# Patient Record
Sex: MALE | Race: WHITE | NOT HISPANIC OR LATINO | Employment: OTHER | URBAN - METROPOLITAN AREA
[De-identification: names, ages, dates, MRNs, and addresses within clinical notes are randomized per-mention and may not be internally consistent; named-entity substitution may affect disease eponyms.]

---

## 2021-01-01 ENCOUNTER — APPOINTMENT (OUTPATIENT)
Dept: PHYSICAL THERAPY | Facility: HOSPITAL | Age: 76
DRG: 193 | End: 2021-01-01
Payer: MEDICARE

## 2021-01-01 ENCOUNTER — APPOINTMENT (OUTPATIENT)
Dept: CT IMAGING | Facility: HOSPITAL | Age: 76
DRG: 193 | End: 2021-01-01
Attending: EMERGENCY MEDICINE
Payer: MEDICARE

## 2021-01-01 ENCOUNTER — APPOINTMENT (OUTPATIENT)
Dept: OCCUPATIONAL THERAPY | Facility: HOSPITAL | Age: 76
DRG: 193 | End: 2021-01-01
Attending: FAMILY MEDICINE
Payer: MEDICARE

## 2021-01-01 ENCOUNTER — HOSPITAL ENCOUNTER (INPATIENT)
Facility: HOSPITAL | Age: 76
LOS: 2 days | Discharge: HOME HEALTH | DRG: 193 | End: 2021-11-09
Attending: EMERGENCY MEDICINE | Admitting: FAMILY MEDICINE
Payer: MEDICARE

## 2021-01-01 ENCOUNTER — APPOINTMENT (OUTPATIENT)
Dept: OCCUPATIONAL THERAPY | Facility: HOSPITAL | Age: 76
DRG: 193 | End: 2021-01-01
Payer: MEDICARE

## 2021-01-01 ENCOUNTER — PATIENT OUTREACH (OUTPATIENT)
Dept: CARE COORDINATION | Facility: CLINIC | Age: 76
End: 2021-01-01
Payer: COMMERCIAL

## 2021-01-01 ENCOUNTER — APPOINTMENT (OUTPATIENT)
Dept: PHYSICAL THERAPY | Facility: HOSPITAL | Age: 76
DRG: 193 | End: 2021-01-01
Attending: FAMILY MEDICINE
Payer: MEDICARE

## 2021-01-01 ENCOUNTER — APPOINTMENT (OUTPATIENT)
Dept: CARDIOLOGY | Facility: HOSPITAL | Age: 76
DRG: 193 | End: 2021-01-01
Attending: HOSPITALIST
Payer: MEDICARE

## 2021-01-01 VITALS
HEIGHT: 72 IN | HEART RATE: 62 BPM | TEMPERATURE: 97.6 F | WEIGHT: 172.6 LBS | OXYGEN SATURATION: 92 % | DIASTOLIC BLOOD PRESSURE: 63 MMHG | SYSTOLIC BLOOD PRESSURE: 119 MMHG | RESPIRATION RATE: 16 BRPM | BODY MASS INDEX: 23.38 KG/M2

## 2021-01-01 DIAGNOSIS — R55 SYNCOPE, UNSPECIFIED SYNCOPE TYPE: ICD-10-CM

## 2021-01-01 DIAGNOSIS — J15.9 COMMUNITY ACQUIRED BACTERIAL PNEUMONIA: ICD-10-CM

## 2021-01-01 DIAGNOSIS — G93.40 ENCEPHALOPATHY ACUTE: Primary | ICD-10-CM

## 2021-01-01 DIAGNOSIS — R07.9 CHEST PAIN, UNSPECIFIED TYPE: ICD-10-CM

## 2021-01-01 DIAGNOSIS — Z71.89 OTHER SPECIFIED COUNSELING: ICD-10-CM

## 2021-01-01 LAB
ANION GAP SERPL CALCULATED.3IONS-SCNC: 8 MMOL/L (ref 5–18)
ANION GAP SERPL CALCULATED.3IONS-SCNC: 8 MMOL/L (ref 5–18)
ATRIAL RATE - MUSE: 62 BPM
BASOPHILS # BLD AUTO: 0.1 10E3/UL (ref 0–0.2)
BASOPHILS NFR BLD AUTO: 1 %
BUN SERPL-MCNC: 12 MG/DL (ref 8–28)
BUN SERPL-MCNC: 16 MG/DL (ref 8–28)
CALCIUM SERPL-MCNC: 10 MG/DL (ref 8.5–10.5)
CALCIUM SERPL-MCNC: 9.7 MG/DL (ref 8.5–10.5)
CHLORIDE BLD-SCNC: 103 MMOL/L (ref 98–107)
CHLORIDE BLD-SCNC: 104 MMOL/L (ref 98–107)
CO2 SERPL-SCNC: 29 MMOL/L (ref 22–31)
CO2 SERPL-SCNC: 30 MMOL/L (ref 22–31)
CREAT SERPL-MCNC: 0.91 MG/DL (ref 0.7–1.3)
CREAT SERPL-MCNC: 1.23 MG/DL (ref 0.7–1.3)
D DIMER PPP FEU-MCNC: 1.26 UG/ML FEU (ref 0–0.5)
DIASTOLIC BLOOD PRESSURE - MUSE: NORMAL MMHG
EOSINOPHIL # BLD AUTO: 0.4 10E3/UL (ref 0–0.7)
EOSINOPHIL NFR BLD AUTO: 3 %
ERYTHROCYTE [DISTWIDTH] IN BLOOD BY AUTOMATED COUNT: 13.8 % (ref 10–15)
ERYTHROCYTE [DISTWIDTH] IN BLOOD BY AUTOMATED COUNT: 14.2 % (ref 10–15)
GFR SERPL CREATININE-BSD FRML MDRD: 57 ML/MIN/1.73M2
GFR SERPL CREATININE-BSD FRML MDRD: 82 ML/MIN/1.73M2
GLUCOSE BLD-MCNC: 107 MG/DL (ref 70–125)
GLUCOSE BLD-MCNC: 85 MG/DL (ref 70–125)
GLUCOSE BLDC GLUCOMTR-MCNC: 101 MG/DL (ref 70–99)
HCT VFR BLD AUTO: 41.6 % (ref 40–53)
HCT VFR BLD AUTO: 43.3 % (ref 40–53)
HGB BLD-MCNC: 13.5 G/DL (ref 13.3–17.7)
HGB BLD-MCNC: 14 G/DL (ref 13.3–17.7)
IMM GRANULOCYTES # BLD: 0 10E3/UL
IMM GRANULOCYTES NFR BLD: 0 %
INTERPRETATION ECG - MUSE: NORMAL
LVEF ECHO: NORMAL
LYMPHOCYTES # BLD AUTO: 2.5 10E3/UL (ref 0.8–5.3)
LYMPHOCYTES NFR BLD AUTO: 21 %
MCH RBC QN AUTO: 30.6 PG (ref 26.5–33)
MCH RBC QN AUTO: 30.9 PG (ref 26.5–33)
MCHC RBC AUTO-ENTMCNC: 32.3 G/DL (ref 31.5–36.5)
MCHC RBC AUTO-ENTMCNC: 32.5 G/DL (ref 31.5–36.5)
MCV RBC AUTO: 95 FL (ref 78–100)
MCV RBC AUTO: 95 FL (ref 78–100)
MONOCYTES # BLD AUTO: 0.9 10E3/UL (ref 0–1.3)
MONOCYTES NFR BLD AUTO: 8 %
NEUTROPHILS # BLD AUTO: 8.1 10E3/UL (ref 1.6–8.3)
NEUTROPHILS NFR BLD AUTO: 67 %
NRBC # BLD AUTO: 0 10E3/UL
NRBC BLD AUTO-RTO: 0 /100
P AXIS - MUSE: 62 DEGREES
PLATELET # BLD AUTO: 234 10E3/UL (ref 150–450)
PLATELET # BLD AUTO: 237 10E3/UL (ref 150–450)
POTASSIUM BLD-SCNC: 4.3 MMOL/L (ref 3.5–5)
POTASSIUM BLD-SCNC: 4.8 MMOL/L (ref 3.5–5)
PR INTERVAL - MUSE: 110 MS
PROCALCITONIN SERPL-MCNC: 0.04 NG/ML (ref 0–0.49)
QRS DURATION - MUSE: 130 MS
QT - MUSE: 412 MS
QTC - MUSE: 418 MS
R AXIS - MUSE: 97 DEGREES
RBC # BLD AUTO: 4.37 10E6/UL (ref 4.4–5.9)
RBC # BLD AUTO: 4.58 10E6/UL (ref 4.4–5.9)
SARS-COV-2 RNA RESP QL NAA+PROBE: NEGATIVE
SODIUM SERPL-SCNC: 140 MMOL/L (ref 136–145)
SODIUM SERPL-SCNC: 142 MMOL/L (ref 136–145)
SYSTOLIC BLOOD PRESSURE - MUSE: NORMAL MMHG
T AXIS - MUSE: 54 DEGREES
TROPONIN I SERPL-MCNC: 0.12 NG/ML (ref 0–0.29)
TROPONIN I SERPL-MCNC: 0.13 NG/ML (ref 0–0.29)
TROPONIN I SERPL-MCNC: 0.18 NG/ML (ref 0–0.29)
VENTRICULAR RATE- MUSE: 62 BPM
WBC # BLD AUTO: 11.9 10E3/UL (ref 4–11)
WBC # BLD AUTO: 8.7 10E3/UL (ref 4–11)

## 2021-01-01 PROCEDURE — G0378 HOSPITAL OBSERVATION PER HR: HCPCS

## 2021-01-01 PROCEDURE — 36415 COLL VENOUS BLD VENIPUNCTURE: CPT | Performed by: HOSPITALIST

## 2021-01-01 PROCEDURE — 250N000013 HC RX MED GY IP 250 OP 250 PS 637: Performed by: FAMILY MEDICINE

## 2021-01-01 PROCEDURE — 255N000002 HC RX 255 OP 636: Performed by: FAMILY MEDICINE

## 2021-01-01 PROCEDURE — 210N000001 HC R&B IMCU HEART CARE

## 2021-01-01 PROCEDURE — 96372 THER/PROPH/DIAG INJ SC/IM: CPT

## 2021-01-01 PROCEDURE — 97116 GAIT TRAINING THERAPY: CPT | Mod: GP

## 2021-01-01 PROCEDURE — 97535 SELF CARE MNGMENT TRAINING: CPT | Mod: GO

## 2021-01-01 PROCEDURE — 97166 OT EVAL MOD COMPLEX 45 MIN: CPT | Mod: GO

## 2021-01-01 PROCEDURE — 80048 BASIC METABOLIC PNL TOTAL CA: CPT | Performed by: FAMILY MEDICINE

## 2021-01-01 PROCEDURE — 84484 ASSAY OF TROPONIN QUANT: CPT | Performed by: EMERGENCY MEDICINE

## 2021-01-01 PROCEDURE — 99232 SBSQ HOSP IP/OBS MODERATE 35: CPT | Performed by: INTERNAL MEDICINE

## 2021-01-01 PROCEDURE — 87635 SARS-COV-2 COVID-19 AMP PRB: CPT | Performed by: EMERGENCY MEDICINE

## 2021-01-01 PROCEDURE — 71275 CT ANGIOGRAPHY CHEST: CPT

## 2021-01-01 PROCEDURE — 36415 COLL VENOUS BLD VENIPUNCTURE: CPT | Performed by: FAMILY MEDICINE

## 2021-01-01 PROCEDURE — 85025 COMPLETE CBC W/AUTO DIFF WBC: CPT | Performed by: EMERGENCY MEDICINE

## 2021-01-01 PROCEDURE — 97110 THERAPEUTIC EXERCISES: CPT | Mod: GP

## 2021-01-01 PROCEDURE — 250N000013 HC RX MED GY IP 250 OP 250 PS 637: Performed by: INTERNAL MEDICINE

## 2021-01-01 PROCEDURE — 84484 ASSAY OF TROPONIN QUANT: CPT | Performed by: HOSPITALIST

## 2021-01-01 PROCEDURE — 250N000011 HC RX IP 250 OP 636

## 2021-01-01 PROCEDURE — 99220 PR INITIAL OBSERVATION CARE,LEVEL III: CPT | Performed by: FAMILY MEDICINE

## 2021-01-01 PROCEDURE — 96365 THER/PROPH/DIAG IV INF INIT: CPT | Mod: 59

## 2021-01-01 PROCEDURE — 93306 TTE W/DOPPLER COMPLETE: CPT | Mod: 26 | Performed by: INTERNAL MEDICINE

## 2021-01-01 PROCEDURE — 93005 ELECTROCARDIOGRAM TRACING: CPT | Performed by: EMERGENCY MEDICINE

## 2021-01-01 PROCEDURE — 99223 1ST HOSP IP/OBS HIGH 75: CPT | Performed by: NURSE PRACTITIONER

## 2021-01-01 PROCEDURE — 36415 COLL VENOUS BLD VENIPUNCTURE: CPT | Performed by: EMERGENCY MEDICINE

## 2021-01-01 PROCEDURE — 999N000208 ECHOCARDIOGRAM COMPLETE

## 2021-01-01 PROCEDURE — 85379 FIBRIN DEGRADATION QUANT: CPT | Performed by: EMERGENCY MEDICINE

## 2021-01-01 PROCEDURE — 96361 HYDRATE IV INFUSION ADD-ON: CPT

## 2021-01-01 PROCEDURE — 250N000011 HC RX IP 250 OP 636: Performed by: EMERGENCY MEDICINE

## 2021-01-01 PROCEDURE — 99239 HOSP IP/OBS DSCHRG MGMT >30: CPT | Performed by: INTERNAL MEDICINE

## 2021-01-01 PROCEDURE — 84145 PROCALCITONIN (PCT): CPT | Performed by: HOSPITALIST

## 2021-01-01 PROCEDURE — 258N000003 HC RX IP 258 OP 636: Performed by: HOSPITALIST

## 2021-01-01 PROCEDURE — 250N000013 HC RX MED GY IP 250 OP 250 PS 637: Performed by: EMERGENCY MEDICINE

## 2021-01-01 PROCEDURE — 85027 COMPLETE CBC AUTOMATED: CPT | Performed by: FAMILY MEDICINE

## 2021-01-01 PROCEDURE — 99285 EMERGENCY DEPT VISIT HI MDM: CPT | Mod: 25

## 2021-01-01 PROCEDURE — 80048 BASIC METABOLIC PNL TOTAL CA: CPT | Performed by: EMERGENCY MEDICINE

## 2021-01-01 PROCEDURE — 97162 PT EVAL MOD COMPLEX 30 MIN: CPT | Mod: GP

## 2021-01-01 PROCEDURE — 82962 GLUCOSE BLOOD TEST: CPT

## 2021-01-01 PROCEDURE — C9803 HOPD COVID-19 SPEC COLLECT: HCPCS

## 2021-01-01 PROCEDURE — 120N000004 HC R&B MS OVERFLOW

## 2021-01-01 PROCEDURE — 250N000013 HC RX MED GY IP 250 OP 250 PS 637: Performed by: NURSE PRACTITIONER

## 2021-01-01 RX ORDER — TAMSULOSIN HYDROCHLORIDE 0.4 MG/1
0.4 CAPSULE ORAL DAILY
Status: DISCONTINUED | OUTPATIENT
Start: 2021-01-01 | End: 2021-01-01 | Stop reason: HOSPADM

## 2021-01-01 RX ORDER — ASPIRIN 81 MG/1
162 TABLET, CHEWABLE ORAL ONCE
Status: COMPLETED | OUTPATIENT
Start: 2021-01-01 | End: 2021-01-01

## 2021-01-01 RX ORDER — CEFDINIR 300 MG/1
300 CAPSULE ORAL EVERY 12 HOURS
Qty: 3 CAPSULE | Refills: 0 | Status: SHIPPED | OUTPATIENT
Start: 2021-01-01 | End: 2021-01-01

## 2021-01-01 RX ORDER — SERTRALINE HYDROCHLORIDE 100 MG/1
100 TABLET, FILM COATED ORAL DAILY
COMMUNITY
Start: 2021-01-01

## 2021-01-01 RX ORDER — VITAMIN B COMPLEX
25 TABLET ORAL DAILY
Status: DISCONTINUED | OUTPATIENT
Start: 2021-01-01 | End: 2021-01-01 | Stop reason: HOSPADM

## 2021-01-01 RX ORDER — QUETIAPINE FUMARATE 25 MG/1
6.25 TABLET, FILM COATED ORAL 3 TIMES DAILY
Qty: 23 TABLET | Refills: 0 | Status: SHIPPED | OUTPATIENT
Start: 2021-01-01 | End: 2021-01-01

## 2021-01-01 RX ORDER — VITAMIN B COMPLEX
1 TABLET ORAL DAILY
COMMUNITY

## 2021-01-01 RX ORDER — CEFTRIAXONE 1 G/1
1 INJECTION, POWDER, FOR SOLUTION INTRAMUSCULAR; INTRAVENOUS ONCE
Status: COMPLETED | OUTPATIENT
Start: 2021-01-01 | End: 2021-01-01

## 2021-01-01 RX ORDER — MEMANTINE HYDROCHLORIDE 10 MG/1
20 TABLET ORAL DAILY
COMMUNITY
Start: 2021-07-26

## 2021-01-01 RX ORDER — ONDANSETRON 2 MG/ML
4 INJECTION INTRAMUSCULAR; INTRAVENOUS EVERY 6 HOURS PRN
Status: DISCONTINUED | OUTPATIENT
Start: 2021-01-01 | End: 2021-01-01 | Stop reason: HOSPADM

## 2021-01-01 RX ORDER — OXYBUTYNIN CHLORIDE 5 MG/1
5 TABLET, EXTENDED RELEASE ORAL DAILY
COMMUNITY
Start: 2021-01-01

## 2021-01-01 RX ORDER — CEFDINIR 300 MG/1
300 CAPSULE ORAL EVERY 12 HOURS
Qty: 6 CAPSULE | Refills: 0 | Status: SHIPPED | OUTPATIENT
Start: 2021-01-01

## 2021-01-01 RX ORDER — IBUPROFEN 200 MG
200 TABLET ORAL EVERY 4 HOURS PRN
Status: DISCONTINUED | OUTPATIENT
Start: 2021-01-01 | End: 2021-01-01 | Stop reason: HOSPADM

## 2021-01-01 RX ORDER — OXYBUTYNIN CHLORIDE 5 MG/1
5 TABLET, EXTENDED RELEASE ORAL DAILY
Status: DISCONTINUED | OUTPATIENT
Start: 2021-01-01 | End: 2021-01-01

## 2021-01-01 RX ORDER — ONDANSETRON 4 MG/1
4 TABLET, ORALLY DISINTEGRATING ORAL EVERY 6 HOURS PRN
Status: DISCONTINUED | OUTPATIENT
Start: 2021-01-01 | End: 2021-01-01 | Stop reason: HOSPADM

## 2021-01-01 RX ORDER — OMEGA-3 FATTY ACIDS/FISH OIL 300-1000MG
200 CAPSULE ORAL EVERY 4 HOURS PRN
COMMUNITY

## 2021-01-01 RX ORDER — QUETIAPINE FUMARATE 25 MG/1
25 TABLET, FILM COATED ORAL AT BEDTIME
Status: DISCONTINUED | OUTPATIENT
Start: 2021-01-01 | End: 2021-01-01 | Stop reason: HOSPADM

## 2021-01-01 RX ORDER — AZITHROMYCIN 250 MG/1
250 TABLET, FILM COATED ORAL DAILY
Status: DISCONTINUED | OUTPATIENT
Start: 2021-01-01 | End: 2021-01-01 | Stop reason: HOSPADM

## 2021-01-01 RX ORDER — AZITHROMYCIN 250 MG/1
500 TABLET, FILM COATED ORAL ONCE
Status: DISCONTINUED | OUTPATIENT
Start: 2021-01-01 | End: 2021-01-01

## 2021-01-01 RX ORDER — AZITHROMYCIN 250 MG/1
500 TABLET, FILM COATED ORAL ONCE
Status: COMPLETED | OUTPATIENT
Start: 2021-01-01 | End: 2021-01-01

## 2021-01-01 RX ORDER — ACETAMINOPHEN 325 MG/1
650 TABLET ORAL EVERY 4 HOURS PRN
Status: DISCONTINUED | OUTPATIENT
Start: 2021-01-01 | End: 2021-01-01 | Stop reason: HOSPADM

## 2021-01-01 RX ORDER — MEMANTINE HYDROCHLORIDE 10 MG/1
20 TABLET ORAL DAILY
Status: DISCONTINUED | OUTPATIENT
Start: 2021-01-01 | End: 2021-01-01 | Stop reason: HOSPADM

## 2021-01-01 RX ORDER — POLYETHYLENE GLYCOL 3350 17 G
2 POWDER IN PACKET (EA) ORAL
Status: DISCONTINUED | OUTPATIENT
Start: 2021-01-01 | End: 2021-01-01

## 2021-01-01 RX ORDER — TAMSULOSIN HYDROCHLORIDE 0.4 MG/1
0.4 CAPSULE ORAL DAILY
COMMUNITY
Start: 2021-01-01

## 2021-01-01 RX ORDER — CEFDINIR 300 MG/1
300 CAPSULE ORAL EVERY 12 HOURS SCHEDULED
Status: DISCONTINUED | OUTPATIENT
Start: 2021-01-01 | End: 2021-01-01 | Stop reason: HOSPADM

## 2021-01-01 RX ORDER — IOPAMIDOL 755 MG/ML
75 INJECTION, SOLUTION INTRAVASCULAR ONCE
Status: COMPLETED | OUTPATIENT
Start: 2021-01-01 | End: 2021-01-01

## 2021-01-01 RX ORDER — DONEPEZIL HYDROCHLORIDE 10 MG/1
10 TABLET, FILM COATED ORAL DAILY
COMMUNITY
Start: 2021-07-02

## 2021-01-01 RX ORDER — ACETAMINOPHEN 650 MG/1
650 SUPPOSITORY RECTAL EVERY 6 HOURS PRN
Status: DISCONTINUED | OUTPATIENT
Start: 2021-01-01 | End: 2021-01-01 | Stop reason: HOSPADM

## 2021-01-01 RX ORDER — ACETAMINOPHEN 325 MG/1
650 TABLET ORAL EVERY 6 HOURS PRN
Status: DISCONTINUED | OUTPATIENT
Start: 2021-01-01 | End: 2021-01-01 | Stop reason: HOSPADM

## 2021-01-01 RX ORDER — DONEPEZIL HYDROCHLORIDE 5 MG/1
10 TABLET, FILM COATED ORAL DAILY
Status: DISCONTINUED | OUTPATIENT
Start: 2021-01-01 | End: 2021-01-01

## 2021-01-01 RX ORDER — DONEPEZIL HYDROCHLORIDE 5 MG/1
10 TABLET, FILM COATED ORAL
Status: DISCONTINUED | OUTPATIENT
Start: 2021-01-01 | End: 2021-01-01 | Stop reason: HOSPADM

## 2021-01-01 RX ORDER — QUETIAPINE FUMARATE 25 MG/1
25 TABLET, FILM COATED ORAL AT BEDTIME
Qty: 30 TABLET | Refills: 0 | Status: SHIPPED | OUTPATIENT
Start: 2021-01-01

## 2021-01-01 RX ORDER — SODIUM CHLORIDE 9 MG/ML
INJECTION, SOLUTION INTRAVENOUS CONTINUOUS
Status: DISCONTINUED | OUTPATIENT
Start: 2021-01-01 | End: 2021-01-01

## 2021-01-01 RX ORDER — AZITHROMYCIN 250 MG/1
250 TABLET, FILM COATED ORAL DAILY
Qty: 1 TABLET | Refills: 0 | Status: SHIPPED | OUTPATIENT
Start: 2021-01-01 | End: 2021-01-01

## 2021-01-01 RX ORDER — OLANZAPINE 10 MG/2ML
2.5 INJECTION, POWDER, FOR SOLUTION INTRAMUSCULAR EVERY 6 HOURS PRN
Status: DISCONTINUED | OUTPATIENT
Start: 2021-01-01 | End: 2021-01-01 | Stop reason: HOSPADM

## 2021-01-01 RX ADMIN — CEFDINIR 300 MG: 300 CAPSULE ORAL at 20:06

## 2021-01-01 RX ADMIN — SERTRALINE HYDROCHLORIDE 100 MG: 50 TABLET ORAL at 08:41

## 2021-01-01 RX ADMIN — CEFDINIR 300 MG: 300 CAPSULE ORAL at 08:55

## 2021-01-01 RX ADMIN — CEFDINIR 300 MG: 300 CAPSULE ORAL at 09:41

## 2021-01-01 RX ADMIN — QUETIAPINE 6.25 MG: 25 TABLET ORAL at 12:06

## 2021-01-01 RX ADMIN — TAMSULOSIN HYDROCHLORIDE 0.4 MG: 0.4 CAPSULE ORAL at 08:41

## 2021-01-01 RX ADMIN — OXYBUTYNIN CHLORIDE 5 MG: 5 TABLET, FILM COATED, EXTENDED RELEASE ORAL at 10:36

## 2021-01-01 RX ADMIN — Medication 25 MCG: at 08:56

## 2021-01-01 RX ADMIN — DONEPEZIL HYDROCHLORIDE 10 MG: 5 TABLET, FILM COATED ORAL at 10:36

## 2021-01-01 RX ADMIN — DONEPEZIL HYDROCHLORIDE 10 MG: 5 TABLET, FILM COATED ORAL at 09:41

## 2021-01-01 RX ADMIN — AZITHROMYCIN 500 MG: 250 TABLET, FILM COATED ORAL at 03:59

## 2021-01-01 RX ADMIN — QUETIAPINE 12.5 MG: 25 TABLET ORAL at 08:41

## 2021-01-01 RX ADMIN — ACETAMINOPHEN 650 MG: 325 TABLET ORAL at 20:25

## 2021-01-01 RX ADMIN — SERTRALINE HYDROCHLORIDE 100 MG: 50 TABLET ORAL at 10:36

## 2021-01-01 RX ADMIN — SERTRALINE HYDROCHLORIDE 100 MG: 50 TABLET ORAL at 08:53

## 2021-01-01 RX ADMIN — IOPAMIDOL 75 ML: 755 INJECTION, SOLUTION INTRAVENOUS at 02:41

## 2021-01-01 RX ADMIN — CEFDINIR 300 MG: 300 CAPSULE ORAL at 19:16

## 2021-01-01 RX ADMIN — MEMANTINE HYDROCHLORIDE 20 MG: 10 TABLET, FILM COATED ORAL at 08:41

## 2021-01-01 RX ADMIN — TAMSULOSIN HYDROCHLORIDE 0.4 MG: 0.4 CAPSULE ORAL at 09:41

## 2021-01-01 RX ADMIN — MEMANTINE HYDROCHLORIDE 20 MG: 10 TABLET, FILM COATED ORAL at 08:55

## 2021-01-01 RX ADMIN — DONEPEZIL HYDROCHLORIDE 10 MG: 5 TABLET, FILM COATED ORAL at 08:41

## 2021-01-01 RX ADMIN — CEFDINIR 300 MG: 300 CAPSULE ORAL at 12:15

## 2021-01-01 RX ADMIN — TAMSULOSIN HYDROCHLORIDE 0.4 MG: 0.4 CAPSULE ORAL at 10:36

## 2021-01-01 RX ADMIN — TAMSULOSIN HYDROCHLORIDE 0.4 MG: 0.4 CAPSULE ORAL at 08:56

## 2021-01-01 RX ADMIN — SERTRALINE HYDROCHLORIDE 100 MG: 50 TABLET ORAL at 09:41

## 2021-01-01 RX ADMIN — PERFLUTREN 4 ML: 6.52 INJECTION, SUSPENSION INTRAVENOUS at 09:04

## 2021-01-01 RX ADMIN — MEMANTINE HYDROCHLORIDE 20 MG: 10 TABLET, FILM COATED ORAL at 09:42

## 2021-01-01 RX ADMIN — OLANZAPINE 2.5 MG: 10 INJECTION, POWDER, FOR SOLUTION INTRAMUSCULAR at 02:01

## 2021-01-01 RX ADMIN — AZITHROMYCIN 250 MG: 250 TABLET, FILM COATED ORAL at 08:53

## 2021-01-01 RX ADMIN — NICOTINE POLACRILEX 2 MG: 2 GUM, CHEWING BUCCAL at 11:17

## 2021-01-01 RX ADMIN — OLANZAPINE 2.5 MG: 10 INJECTION, POWDER, FOR SOLUTION INTRAMUSCULAR at 19:16

## 2021-01-01 RX ADMIN — ASPIRIN 81 MG CHEWABLE TABLET 162 MG: 81 TABLET CHEWABLE at 01:55

## 2021-01-01 RX ADMIN — CEFTRIAXONE SODIUM 1 G: 1 INJECTION, POWDER, FOR SOLUTION INTRAMUSCULAR; INTRAVENOUS at 03:55

## 2021-01-01 RX ADMIN — Medication 25 MCG: at 08:41

## 2021-01-01 RX ADMIN — Medication 25 MCG: at 09:41

## 2021-01-01 RX ADMIN — AZITHROMYCIN 250 MG: 250 TABLET, FILM COATED ORAL at 09:42

## 2021-01-01 RX ADMIN — AZITHROMYCIN 250 MG: 250 TABLET, FILM COATED ORAL at 08:41

## 2021-01-01 RX ADMIN — OXYBUTYNIN CHLORIDE 5 MG: 5 TABLET, FILM COATED, EXTENDED RELEASE ORAL at 08:55

## 2021-01-01 RX ADMIN — CEFDINIR 300 MG: 300 CAPSULE ORAL at 08:41

## 2021-01-01 RX ADMIN — Medication 25 MCG: at 10:36

## 2021-01-01 RX ADMIN — MEMANTINE HYDROCHLORIDE 20 MG: 10 TABLET, FILM COATED ORAL at 10:36

## 2021-01-01 RX ADMIN — CEFDINIR 300 MG: 300 CAPSULE ORAL at 21:49

## 2021-01-01 RX ADMIN — SODIUM CHLORIDE: 9 INJECTION, SOLUTION INTRAVENOUS at 08:29

## 2021-01-01 RX ADMIN — DONEPEZIL HYDROCHLORIDE 10 MG: 5 TABLET, FILM COATED ORAL at 08:53

## 2021-01-01 RX ADMIN — QUETIAPINE 12.5 MG: 25 TABLET ORAL at 20:06

## 2021-01-01 ASSESSMENT — ENCOUNTER SYMPTOMS
DIARRHEA: 1
NAUSEA: 0
VOMITING: 1
LIGHT-HEADEDNESS: 1
DIZZINESS: 1
DIAPHORESIS: 1

## 2021-01-01 ASSESSMENT — ACTIVITIES OF DAILY LIVING (ADL)
ADLS_ACUITY_SCORE: 20
WALKING_OR_CLIMBING_STAIRS_DIFFICULTY: YES
ADLS_ACUITY_SCORE: 20
HEARING_DIFFICULTY_OR_DEAF: NO
ADLS_ACUITY_SCORE: 14
DEPENDENT_IADLS:: CLEANING;COOKING;LAUNDRY;SHOPPING;MEAL PREPARATION;MEDICATION MANAGEMENT;TRANSPORTATION
DIFFICULTY_COMMUNICATING: NO
ADLS_ACUITY_SCORE: 20
DIFFICULTY_EATING/SWALLOWING: NO
DOING_ERRANDS_INDEPENDENTLY_DIFFICULTY: YES
ADLS_ACUITY_SCORE: 14
ADLS_ACUITY_SCORE: 20
ADLS_ACUITY_SCORE: 14
TOILETING_ISSUES: YES
DRESSING/BATHING_DIFFICULTY: YES
ADLS_ACUITY_SCORE: 20
ADLS_ACUITY_SCORE: 14
ADLS_ACUITY_SCORE: 20
WEAR_GLASSES_OR_BLIND: NO
ADLS_ACUITY_SCORE: 20
ADLS_ACUITY_SCORE: 20
TOILETING_ASSISTANCE: TOILETING DIFFICULTY, ASSISTANCE 1 PERSON
ADLS_ACUITY_SCORE: 20
DRESSING/BATHING: BATHING DIFFICULTY, ASSISTANCE 1 PERSON;DRESSING DIFFICULTY, ASSISTANCE 1 PERSON
ADLS_ACUITY_SCORE: 20
CONCENTRATING,_REMEMBERING_OR_MAKING_DECISIONS_DIFFICULTY: YES
ADLS_ACUITY_SCORE: 20
ADLS_ACUITY_SCORE: 22
ADLS_ACUITY_SCORE: 20
ADLS_ACUITY_SCORE: 14
ADLS_ACUITY_SCORE: 20
ADLS_ACUITY_SCORE: 14
ADLS_ACUITY_SCORE: 20
ADLS_ACUITY_SCORE: 20
ADLS_ACUITY_SCORE: 22

## 2021-01-01 ASSESSMENT — MIFFLIN-ST. JEOR
SCORE: 1476.08
SCORE: 1555.91

## 2021-11-06 PROBLEM — R07.9 CHEST PAIN, UNSPECIFIED TYPE: Status: ACTIVE | Noted: 2021-01-01

## 2021-11-06 PROBLEM — R55 SYNCOPE, UNSPECIFIED SYNCOPE TYPE: Status: ACTIVE | Noted: 2021-01-01

## 2021-11-06 PROBLEM — J15.9 COMMUNITY ACQUIRED BACTERIAL PNEUMONIA: Status: ACTIVE | Noted: 2021-01-01

## 2021-11-06 NOTE — ED TRIAGE NOTES
CP for 2-3 days, worse tonight. No intervention at home. No cardiac HX. He smokes.  Alzheimer's. Wife t side. He reports a pin of 7, left side with deep breaths. He denies cough or congestion.

## 2021-11-06 NOTE — ED PROVIDER NOTES
EMERGENCY DEPARTMENT ENCOUNTER      NAME: Cliff Clark  AGE: 76 year old male  YOB: 1945  MRN: [id number]  EVALUATION DATE & TIME: No admission date for patient encounter.    PCP: Thomas Maldonado    ED PROVIDER: Yoli Mandujano M.D.      Chief Complaint   Patient presents with     Chest Pain         FINAL IMPRESSION:  1. Syncope, unspecified syncope type    2. Chest pain, unspecified type    3. Community acquired bacterial pneumonia        MEDICAL DECISION MAKIN:17 AM I met with the patient, obtained history, performed an initial exam, and discussed options and plan for diagnostics and treatment here in the ED. PPE (gloves, goggles, N95, and homemade mask) worn during patient encounter.   3:38 AM I re-evaluated the patient.  3:39 AM I discussed the case with hospitalist, Dr. Hutchinson, who accepts the patient for admission.    Pertinent Labs & Imaging studies reviewed. (See chart for details)     Cliff Clark is a 76 year old male who presents with possible syncope and chest pain.  Vital signs show oxygen saturation 93% on room air.  He is otherwise in no overt respiratory distress.  Chest pain does have both an exertional and pleuritic component.  Wife is concerned because he was recently diagnosed with Alzheimer's and has had increasing instability as well as presyncopal events.  Additionally has had some unwitnessed falls which she feels are likely syncopal.  The episode earlier in the day today when he was vacuuming, became diaphoretic and almost syncopized is concerning for either PE or cardiac etiology.  ECG done on arrival shows a right bundle branch block but no signs of acute ischemia.  He is a smoker but has no prior history of PE or malignancy.  He is vaccinated against Covid and has no symptoms currently.  I will get a D-dimer, basic labs including a troponin and consider a CT PE run instead of a chest x-ray if D-dimer is elevated.  He will get a full-strength aspirin.    D-dimer  is elevated so we proceeded with a CT scan.  Chemistry is normal.  CBC is normal.  Troponin is 0.18.  CT scan shows a pneumonia and no PE or malignancy.  No pneumothorax or pleural effusions that could account for his pain.  He will get ceftriaxone and azithromycin for the pneumonia but I do feel that his anginal type symptoms do warrant a more thorough cardiac evaluation.  We will plan hospital observation with serial troponins and cardiology consult in the morning.  I discussed with Dr. Hutchinson who accepted the patient for further cares.  The patient and his wife are in agreement with this care plan.         MEDICATIONS GIVEN IN THE EMERGENCY:  Medications   cefTRIAXone (ROCEPHIN) 1 g vial to attach to  mL bag for ADULTS or NS 50 mL bag for PEDS (has no administration in time range)   azithromycin (ZITHROMAX) tablet 500 mg (has no administration in time range)   aspirin (ASA) chewable tablet 162 mg (162 mg Oral Given 11/6/21 0155)   iopamidol (ISOVUE-370) solution 75 mL (75 mLs Intravenous Given 11/6/21 0241)         =================================================================    HPI    Patient information was obtained from: Patient and Wife    Use of : N/A        Cliff Clark is a 76 year old male with a history of Alzheimer's who presents for evaluation of chest pain.    Patient reports after supper on 11/5 (~1 day ago) he has sudden onset of sharp, stabbing left-sided chest pain. The pain has been constant in nature since onset. Pain is exacerbated with breathing and exertion. He denies any associated dizziness, lightheadedness, or diaphoresis.    Wife notes that patient about 1 week ago was at home when he had a sudden onset of diaphoresis, chest pain, lightheadedness, and dizziness after vacuuming a room. He tried to rest, but then he had diarrhea and vomiting. After patient vomited, this episode cleared and patient reports symptoms resolved. In addition, patient has what he described  as a mechanical fall on 11/3 (~3 days ago) when trying to get to the gas station for cigarettes. He sustained a facial abrasion that has since healed. He denies any preceding chest pain or dizziness. Wife also mentions patient has had increasing difficulty with gait, which may have contributed to the fall.     Patient currently smokes about 4-5 cigarettes daily. Denies nausea, vomiting, or additional medical concerns or complaints at this time.          REVIEW OF SYSTEMS   Review of Systems   Constitutional: Positive for diaphoresis (resolved).   Cardiovascular: Positive for chest pain (sharp, severe, constant; left-sided).   Gastrointestinal: Positive for diarrhea (resolved) and vomiting (resolved). Negative for nausea.   Musculoskeletal: Positive for gait problem (unsteady).   Neurological: Positive for dizziness (resolved) and light-headedness (resolved).   All other systems reviewed and are negative.       PAST MEDICAL HISTORY:  History reviewed. No pertinent past medical history.    PAST SURGICAL HISTORY:  History reviewed. No pertinent surgical history.    CURRENT MEDICATIONS:      Current Facility-Administered Medications:      azithromycin (ZITHROMAX) tablet 500 mg, 500 mg, Oral, Once, Yoli Mandujano MD     cefTRIAXone (ROCEPHIN) 1 g vial to attach to  mL bag for ADULTS or NS 50 mL bag for PEDS, 1 g, Intravenous, Once, Yoli Mandujano MD  No current outpatient medications on file.    ALLERGIES:  No Known Allergies    FAMILY HISTORY:  History reviewed. No pertinent family history.    SOCIAL HISTORY:   Social History     Tobacco Use     Smoking status: None   Substance Use Topics     Alcohol use: None     Drug use: None        PHYSICAL EXAM:    Vitals: BP (!) 163/72   Pulse 68   Temp 98  F (36.7  C) (Oral)   Resp 17   Ht 1.829 m (6')   Wt 70.3 kg (155 lb)   SpO2 94%   BMI 21.02 kg/m     General:. Alert and interactive, comfortable appearing.  HENT: Oropharynx without erythema or exudates.  MMM.  TMs clear bilaterally. Healed abrasion over left side of mouth.   Eyes: Pupils mid-sized and equally reactive.   Neck: Full AROM.  No midline tenderness to palpation.  Cardiovascular: Regular rate and rhythm. Peripheral pulses 2+ bilaterally.  Chest/Pulmonary: Normal work of breathing. Lung sounds clear and equal throughout, no wheezes or crackles. No chest wall tenderness or deformities.  Abdomen: Soft, nondistended. Nontender without guarding or rebound.  Back/Spine: No CVA or midline tenderness.  Extremities: Normal ROM of all major joints. No lower extremity edema.   Skin: Warm and dry. Normal skin color.   Neuro: Speech clear. CNs grossly intact. Moves all extremities appropriately. Strength and sensation grossly intact to all extremities. No focal deficits. Obvious gait instability when ambulating.   Psych: Normal affect/mood, cooperative, memory appropriate.     LAB:  All pertinent labs reviewed and interpreted.  Labs Ordered and Resulted from Time of ED Arrival to Time of ED Departure   BASIC METABOLIC PANEL - Abnormal       Result Value    Sodium 140      Potassium 4.8      Chloride 103      Carbon Dioxide (CO2) 29      Anion Gap 8      Urea Nitrogen 16      Creatinine 1.23      Calcium 9.7      Glucose 107      GFR Estimate 57 (*)    D DIMER QUANTITATIVE - Abnormal    D-Dimer Quantitative 1.26 (*)    CBC WITH PLATELETS AND DIFFERENTIAL - Abnormal    WBC Count 11.9 (*)     RBC Count 4.58      Hemoglobin 14.0      Hematocrit 43.3      MCV 95      MCH 30.6      MCHC 32.3      RDW 14.2      Platelet Count 237      % Neutrophils 67      % Lymphocytes 21      % Monocytes 8      % Eosinophils 3      % Basophils 1      % Immature Granulocytes 0      NRBCs per 100 WBC 0      Absolute Neutrophils 8.1      Absolute Lymphocytes 2.5      Absolute Monocytes 0.9      Absolute Eosinophils 0.4      Absolute Basophils 0.1      Absolute Immature Granulocytes 0.0      Absolute NRBCs 0.0     TROPONIN I - Normal     Troponin I 0.18     COVID-19 VIRUS (CORONAVIRUS) BY PCR - Normal    SARS CoV2 PCR Negative         RADIOLOGY:  CT Chest Pulmonary Embolism w Contrast   Final Result   IMPRESSION:   1.  No evidence for pulmonary embolism.      2.  Normal caliber aorta without dissection.      3.  Hazy interstitial and alveolar infiltrate medial aspect superior segment right lower lobe compatible with pneumonia.      4.  Minimal centrilobular and paraseptal emphysema.          EKG:   Sinus rhythm with right bundle branch block  No acute ST elevation or depression  No obvious signs of electrolyte abnormality  No prior ECGs for comparison    I, Yoli Christensen, am serving as a scribe to document services personally performed by Dr. Yoli Mandujano  based on my observation and the provider's statements to me. I, Yoli Mandujano MD attest that Yoli Christensen is acting in a scribe capacity, has observed my performance of the services and has documented them in accordance with my direction.      Yoli Mandujano M.D.  Emergency Medicine  [facility] EMERGENCY DEPARTMENT  [facility] [facility] [id number] [phone]  Dept: [phone]         Yoli Mandujano MD  11/06/21 0432

## 2021-11-06 NOTE — CONSULTS
Care Management Initial Consult    General Information  Assessment completed with: Children,  (daughter)  Type of CM/SW Visit: Initial Assessment    Primary Care Provider verified and updated as needed: Yes   Readmission within the last 30 days: no previous admission in last 30 days      Reason for Consult: discharge planning  Advance Care Planning:            Communication Assessment  Patient's communication style: spoken language (English or Bilingual)             Cognitive  Cognitive/Neuro/Behavioral: WDL                      Living Environment:   People in home: spouse     Current living Arrangements: house      Able to return to prior arrangements: yes       Family/Social Support:  Care provided by: spouse/significant other  Provides care for:    Marital Status:   Wife          Description of Support System: Supportive, Involved         Current Resources:   Patient receiving home care services: No     Community Resources: None  Equipment currently used at home: none  Supplies currently used at home: None    Employment/Financial:  Employment Status:          Financial Concerns:             Lifestyle & Psychosocial Needs:  Social Determinants of Health     Tobacco Use: High Risk     Smoking Tobacco Use: Current Every Day Smoker     Smokeless Tobacco Use: Unknown   Alcohol Use:      Frequency of Alcohol Consumption:      Average Number of Drinks:      Frequency of Binge Drinking:    Financial Resource Strain:      Difficulty of Paying Living Expenses:    Food Insecurity:      Worried About Running Out of Food in the Last Year:      Ran Out of Food in the Last Year:    Transportation Needs:      Lack of Transportation (Medical):      Lack of Transportation (Non-Medical):    Physical Activity:      Days of Exercise per Week:      Minutes of Exercise per Session:    Stress:      Feeling of Stress :    Social Connections:      Frequency of Communication with Friends and Family:      Frequency of Social  Gatherings with Friends and Family:      Attends Alevism Services:      Active Member of Clubs or Organizations:      Attends Club or Organization Meetings:      Marital Status:    Intimate Partner Violence:      Fear of Current or Ex-Partner:      Emotionally Abused:      Physically Abused:      Sexually Abused:    Depression:      PHQ-2 Score:    Housing Stability:      Unable to Pay for Housing in the Last Year:      Number of Places Lived in the Last Year:      Unstable Housing in the Last Year:        Functional Status:  Prior to admission patient needed assistance:   Dependent ADLs:: Bathing, Eating  Dependent IADLs:: Cleaning, Cooking, Laundry, Shopping, Meal Preparation, Medication Management, Transportation  Assesssment of Functional Status: Not at baseline with ADL Functioning    Mental Health Status:          Chemical Dependency Status:                Values/Beliefs:  Spiritual, Cultural Beliefs, Alevism Practices, Values that affect care:                 Additional Information:  AIDET completed. Writer spoke with patient's daughter Keyla. Pt lives with spouse Claritza: [phone]  in a private residence in [place]. Wife assists with dressing, bathing as needed, re-directing, meals, meds, house work, transportation. Patient ambulates on his own but is more unsteady. At this time he doesn't use a cane or walker. He fell outside three days ago. Pt has Alzheimers, wanders at times. Wife would like PT; home care at discharge if possible. Family is interested in resources for home or long term memory care options. Writer placed referral to [facility] for assist post discharge. In ED patient is 1:1.      JOHNSON and Observation status given and explained, pt and family verbalized understanding.     Anticipate pt will DC back home with home care; nurse, PT, HHA to begin. Possible additional care through assist from [facility] or memory care at some point.     Family will transport upon DC. Informed  that CM will follow progression of care.   Caren Lofton RN, CM, ADE

## 2021-11-06 NOTE — H&P
[facility]    History and Physical - Hospitalist Service       Date of Admission:  11/6/2021    Assessment & Plan      Cliff Clark is a 76 year old male admitted on 11/6/2021 with right lower lobe pneumonia and left-sided chest pain.    1. Right lower lobe pneumonia  --Community-acquired pneumonia, COVID-19 testing is negative  --Started Ceftriaxone and Azithromycin on diagnosis, changed to oral to minimize IV lines due to dementia.  --Had mild hypoxia on presentation to the emergency department which has since resolved, continue to monitor and provide supplemental oxygen as needed  --Monitor for any symptoms of dysphagia given this is a right-sided pneumonia    2. Reports of left-sided chest pain  --Check serial troponin (normal), EKG shows right bundle branch block and short GA interval, no signs of ischemia  -- Cardiac seem less likely, he did fall on his left side 3 days ago so the pain could be related to that.  --PE run was negative for PE  --Echocardiogram results okay    3. Alzheimer dementia  --Patient is an unreliable historian  -- Aricept started January 2021, Namenda started June 2021   --Delirium prevention protocols will be necessary    4. Unwitnessed falls  --This could be mechanical falls, could be syncopal events related to the pneumonia and some mild hypoxia at home  --Monitor on telemetry, PT and OT recommend outpatient follow up    5. Tobacco use  --Cessation is encouraged    6. Urinary incontinence:  -- Started low dose oxybutynin just 2 weeks ago by urology  -- Continue this and flomax       Diet:   Regular  DVT Prophylaxis: Low Risk/Ambulatory with no VTE prophylaxis indicated  Ryan Catheter: Not present  Central Lines: None  Code Status:   FULL    Clinically Significant Risk Factors Present on Admission              # Platelet Defect: home medication list includes an antiplatelet medication      Disposition Plan   Expected discharge:  11/7/21 recommended to prior  living arrangement once antibiotic plan established.     The patient's care was discussed with the Patient.    Ruth Buckner MD  [facility]  Securely message with the [employer] Web Console (learn more here)  Text page via [employer] Paging/Directory        ______________________________________________________________________    Chief Complaint   Chest pain    History is obtained from the patient and validated through the record    History of Present Illness   Cliff Clark is a 76 year old male who has history of Alzheimer's dementia and urinary incontinence who presented to the emergency department with chest pain and unwitnessed falls.  On presentation he was accompanied by his wife who is since gone home.  The patient is an unreliable historian because of his dementia.    Most of the history is taken from the chart.   The patient reported sudden onset of sharp stabbing left-sided chest pain that began after dinner yesterday.  He reports the pain is been constant in nature since it came on its located at the bottom of the left rib cage anteriorly.  He endorses worsening with deep breath and exertion.  He says it is potentially somewhat worse as well with palpation to that area.  Upon questioning, he reports the pain is still present.    The patient and his wife are visiting from [place] where they reside. His wife noted that about a week ago the patient had a sudden onset of diaphoresis, chest pain, lightheadedness and dizziness when vacuuming.  He rested after having the symptoms but then had episode of diarrhea and vomiting.  After vomiting his symptoms improved.   His wife reports his gait has been more unstable recently. He fell about 3 days ago while out walking. A bystander saw the fall and called EMS. He declined transport to the hospital and was instead driven home. He sustained a left sided facial abrasion at that time.     Review of Systems    Review of systems not obtained due to  patient factors - confusion    Past Medical History    I have reviewed this patient's medical history and updated it with pertinent information if needed.   Past Medical History:   Diagnosis Date     Late onset Alzheimer's dementia without behavioral disturbance (H)      Mixed hyperlipidemia      Urinary incontinence        Past Surgical History   I have reviewed this patient's surgical history and updated it with pertinent information if needed.  History reviewed. No pertinent surgical history.    Social History   I have reviewed this patient's social history and updated it with pertinent information if needed.  Social History     Tobacco Use     Smoking status: Current Every Day Smoker     Packs/day: 0.25     Types: Cigarettes   Substance Use Topics     Alcohol use: None     Drug use: None       Family History   I have reviewed this patient's family history and updated it with pertinent information if needed.  Family History   Problem Relation Age of Onset     Alzheimer Disease Father          Prior to Admission Medications   Prior to Admission Medications   Prescriptions Last Dose Informant Patient Reported? Taking?   Vitamin D3 (CHOLECALCIFEROL) 25 mcg (1000 units) tablet 11/5/2021 at Unknown time  Yes Yes   Sig: Take 1 tablet by mouth daily   aspirin-acetaminophen-caffeine (EXCEDRIN MIGRAINE) 250-250-65 MG tablet 11/5/2021 at Unknown time  Yes Yes   Sig: Take 2 tablets by mouth daily as needed   donepezil (ARICEPT) 10 MG tablet 11/5/2021 at Unknown time  Yes Yes   Sig: Take 10 mg by mouth daily   ibuprofen (ADVIL/MOTRIN) 200 MG capsule Past Month at Unknown time  Yes Yes   Sig: Take 200 mg by mouth every 4 hours as needed   memantine (NAMENDA) 10 MG tablet 11/5/2021 at Unknown time  Yes Yes   Sig: Take 20 mg by mouth daily    oxybutynin ER (DITROPAN-XL) 5 MG 24 hr tablet 11/5/2021 at Unknown time  Yes Yes   Sig: Take 5 mg by mouth daily   sertraline (ZOLOFT) 100 MG tablet 11/5/2021 at Unknown time  Yes Yes    Sig: Take 100 mg by mouth daily   tamsulosin (FLOMAX) 0.4 MG capsule 11/5/2021 at Unknown time  Yes Yes   Sig: Take 0.4 mg by mouth daily      Facility-Administered Medications: None     Allergies   No Known Allergies    Physical Exam   Vital Signs: Temp: 98.8  F (37.1  C) Temp src: Oral BP: (!) 142/75 Pulse: 66   Resp: 22 SpO2: 91 % O2 Device: Nasal cannula Oxygen Delivery: 2 LPM  Weight: 155 lbs 0 oz    General: Well developed elderly man, No apparent distress  Head: Normocephalic, he has dried blood on the left side of his mouth from his previous abrasion  Eyes: Pupils equal, round, Extra-ocular movements intact  Mouth: Mucus membranes moist  Neck: Supple  Cardiovascular: Regular rate and rhythm, Normal S1, S2  Lungs: Clear to auscultation bilaterally  Abdomen: Soft, Non-tender, not distended, Bowel sounds present  Extremities: No edema, no clubbing  Skin: Warm and well-perfused without lesions.  Neurologic: Alert and forgetful. (Asks my name repeatedly) face is symmetric, Moves all extremities equally      Data   Data reviewed today: I reviewed all medications, new labs and imaging results over the last 24 hours.     Recent Labs   Lab 11/06/21  0150   WBC 11.9*   HGB 14.0   MCV 95         POTASSIUM 4.8   CHLORIDE 103   CO2 29   BUN 16   CR 1.23   ANIONGAP 8   RYAN 9.7        Recent Results (from the past 24 hour(s))   CT Chest Pulmonary Embolism w Contrast    Narrative    EXAM: CT CHEST PULMONARY EMBOLISM W CONTRAST  LOCATION: [facility]  DATE/TIME: 11/6/2021 2:39 AM    INDICATION: PE suspected, low/intermediate probability, positive D-dimer. Chest pain.  COMPARISON: None.  TECHNIQUE: CT chest pulmonary angiogram during arterial phase injection of IV contrast. Multiplanar reformats and MIP reconstructions were performed. Dose reduction techniques were used.   CONTRAST: Isovue 370 75mL    FINDINGS:  ANGIOGRAM CHEST: Pulmonary arteries are normal caliber and negative  for pulmonary emboli. Thoracic aorta is negative for dissection. No CT evidence of right heart strain.    LUNGS AND PLEURA: Minimal centrilobular and paraseptal emphysema. Hazy ill-defined interstitial and alveolar infiltrate medial superior segment right lower lobe. Minimal linear atelectasis.    MEDIASTINUM/AXILLAE: No lymphadenopathy. Normal esophagus. No significant pericardial effusion.    CORONARY ARTERY CALCIFICATION: Severe.    UPPER ABDOMEN: Exophytic well-circumscribed homogeneously low-attenuation lesion left lower renal pole demonstrating Hounsfield units most compatible with a cyst for which no further follow-up is necessary (less than 20).    MUSCULOSKELETAL: Minimal degenerative changes in the spine.      Impression    IMPRESSION:  1.  No evidence for pulmonary embolism.    2.  Normal caliber aorta without dissection.    3.  Hazy interstitial and alveolar infiltrate medial aspect superior segment right lower lobe compatible with pneumonia.    4.  Minimal centrilobular and paraseptal emphysema.

## 2021-11-06 NOTE — PROGRESS NOTES
[facility]      OUTPATIENT OCCUPATIONAL THERAPY  EVALUATION  PLAN OF TREATMENT FOR OUTPATIENT REHABILITATION  (COMPLETE FOR INITIAL CLAIMS ONLY)  Patient's Last Name, First Name, M.I.  YOB: 1945  Cliff Clark                          Provider's Name  [facility] Medical Record No.  [id number]                               Onset Date:  11/06/21   Start of Care Date:  11/06/21     Type:     ___PT   _X_OT   ___SLP Medical Diagnosis:  (P) pneumonia                        OT Diagnosis:  Decreased ADL independence   Visits from SOC:  1   _________________________________________________________________________________  Plan of Treatment/Functional Goals    Planned Interventions: ADL retraining, balance training, transfer training, bed mobility training   Goals: See Occupational Therapy Goals on Care Plan in [facility] electronic health record.    Therapy Frequency: Daily  Predicted Duration of Therapy Intervention: 7 days  _________________________________________________________________________________    I CERTIFY THE NEED FOR THESE SERVICES FURNISHED UNDER        THIS PLAN OF TREATMENT AND WHILE UNDER MY CARE     (Physician co-signature of this document indicates review and certification of the therapy plan).                Certification date from: 11/06/21, Certification date to: 11/13/21    Referring Physician: Dudley            Initial Assessment        See Occupational Therapy evaluation dated 11/06/21 in Epic electronic health record.

## 2021-11-06 NOTE — ED NOTES
Plan to continue monitoring 1:1 in pt's current room 5, discussed with charge RN and will have aide available to monitor 1:1.

## 2021-11-06 NOTE — PROGRESS NOTES
Occupational Therapy       11/06/21 1315   Quick Adds   Type of Visit Initial Occupational Therapy Evaluation   Living Environment   People in home spouse   Current Living Arrangements house   Home Accessibility stairs to enter home   Number of Stairs, Main Entrance 2   Stair Railings, Main Entrance railings on both sides of stairs   Living Environment Comments Patient independent with ADLs at baseline.   Self-Care   Usual Activity Tolerance excellent   Equipment Currently Used at Home none   Disability/Function   Concentrating, Remembering or Making Decisions Difficulty yes   Difficulty Communicating no   Difficulty Eating/Swallowing no   Walking or Climbing Stairs Difficulty no   Dressing/Bathing Difficulty no   Toileting issues no   Fall history within last six months yes   Number of times patient has fallen within last six months 2   General Information   Onset of Illness/Injury or Date of Surgery 11/06/21   Referring Physician Dudley   Patient/Family Therapy Goal Statement (OT) get home   Additional Occupational Profile Info/Pertinent History of Current Problem no   Existing Precautions/Restrictions no known precautions/restrictions   Cognitive Status Examination   Cognitive Status Comments Patient has history of dementia.    Bed Mobility   Bed Mobility supine-sit;sit-supine   Supine-Sit [place] (Bed Mobility) supervision   Sit-Supine [place] (Bed Mobility) supervision   Transfers   Transfers bed-chair transfer   Transfer Skill: Bed to Chair/Chair to Bed   Bed-Chair [place] (Transfers) supervision   Clinical Impression   Criteria for Skilled Therapeutic Interventions Met (OT) yes;meets criteria;skilled treatment is necessary   OT Diagnosis Decreased ADL independence   OT Problem List-Impairments impacting ADL problems related to;balance;strength   Assessment of Occupational Performance 3-5 Performance Deficits   Identified Performance Deficits dressing, trsfs, bed mob   Planned Therapy  Interventions (OT) ADL retraining;balance training;transfer training;bed mobility training   Clinical Decision Making Complexity (OT) moderate complexity   Therapy Frequency (OT) Daily   Predicted Duration of Therapy 7 days   Risk & Benefits of therapy have been explained evaluation/treatment results reviewed;care plan/treatment goals reviewed   OT Discharge Planning    OT Discharge Recommendation (DC Rec) Home with assist   Therapy Certification   Start of Care Date 11/06/21   Certification date from 11/06/21   Certification date to 11/13/21   Medical Diagnosis pneumonia   Total Evaluation Time (Minutes)   Total Evaluation Time (Minutes) 10

## 2021-11-06 NOTE — PHARMACY-ADMISSION MEDICATION HISTORY
Pharmacy Note - Admission Medication History    Pertinent Provider Information: n/a     ______________________________________________________________________    Prior To Admission (PTA) med list completed and updated in EMR.       PTA Med List   Medication Sig Note Last Dose     aspirin-acetaminophen-caffeine (EXCEDRIN MIGRAINE) 250-250-65 MG tablet Take 2 tablets by mouth daily as needed  11/5/2021 at Unknown time     donepezil (ARICEPT) 10 MG tablet Take 10 mg by mouth daily  11/5/2021 at Unknown time     ibuprofen (ADVIL/MOTRIN) 200 MG capsule Take 200 mg by mouth every 4 hours as needed  Past Month at Unknown time     memantine (NAMENDA) 10 MG tablet Take 20 mg by mouth daily   11/5/2021 at Unknown time     oxybutynin ER (DITROPAN-XL) 5 MG 24 hr tablet Take 5 mg by mouth daily  11/5/2021 at Unknown time     sertraline (ZOLOFT) 100 MG tablet Take 100 mg by mouth daily  11/5/2021 at Unknown time     tamsulosin (FLOMAX) 0.4 MG capsule Take 0.4 mg by mouth daily  11/5/2021 at Unknown time     Vitamin D3 (CHOLECALCIFEROL) 25 mcg (1000 units) tablet Take 1 tablet by mouth daily 11/6/2021: OTC; dose unknown 11/5/2021 at Unknown time       Information source(s): Family member and CareEverywhere/SureScripts  Method of interview communication: phone    Summary of Changes to PTA Med List  New: see list above  Discontinued: n/a  Changed: n/a    Patient was asked about OTC/herbal products specifically.  PTA med list reflects this.    In the past week, patient estimated taking medication this percent of the time:  greater than 90%.    Allergies were reviewed, assessed, and updated with the patient.      Patient does not use any multi-dose medications prior to admission.    The information provided in this note is only as accurate as the sources available at the time of the update(s).    Thank you for the opportunity to participate in the care of this patient.    Rhonda Lazaro  11/6/2021 8:26 AM

## 2021-11-07 PROBLEM — G93.40 ENCEPHALOPATHY ACUTE: Status: ACTIVE | Noted: 2021-01-01

## 2021-11-07 NOTE — PROGRESS NOTES
RNCM received update from therapy that recommendation has changed from home with home care to TCU.  PT reports that patient's spouse no longer feels she can adequately provide safe discharge to home.  Writer met with patient and spouse, Claritza, to introduce self and discuss therapy recommendations.  Patient has Alzheimer's and spouse has been caring for him at home.  Patient currently has 1:1 sitter for safety.  Patient states he does not need to go or want to go to rehab facility.  Patient's spouse informed therapy that she would like TCU in [place] with [facility] or [facility] as her preference.  Referrals sent to several [facility] facilities.  CM will follow up on 11/08.     Lupe Espino RN, Care Manager

## 2021-11-07 NOTE — PROGRESS NOTES
"Pt spoke with his wife this morning.  She will be in later to see him.  Pt stated \"Claritza, get me out of here as soon as possible\".  Wife reinforced that he needs to get stronger before he leaves.  Wife stated that patient used to smoke up to 5 cigarettes a day but that he has cut down to about 1 a day.  PRN nicotine lozenge requested from pharmacy.  Patient took AM medications without resistance.  This includes oral antibiotic for diagnosis of pneumonia. Patient is making needs/requests known.  He reported need to urinate and voided 150cc.  He ate 75% of breakfast.  Soft wrist restraints remain off.    "

## 2021-11-07 NOTE — PROGRESS NOTES
Pt confused x3.  He states he is a hospital near where his daughter lives.  He is unable to state the city.  He stated it was September 2020.  Patient states he is here because he fell and scuffed his lip.  He meantioned he cannot relax because the bed is too small.  Pt repositioned and assisted to standing.  He reported dizziness with postiton change.  Orthostatics done.  Laying 149/91 with heart rate 61.  Sitting 155/102 HR 66 with dizziness.  Standing 144/79 HR 82. Patient needed assist with standing as he was feeling dizzy.  Soft wrist restraints taken off 0745. Attending saw patient this AM and is aware.  PT/OT to be ordered.  Pt contenet watching TV.  Minimizing stimulation at this time.  Breakfast ordered.

## 2021-11-07 NOTE — PROGRESS NOTES
"Patient assisted back to bed after sitting for much of the afternoon watching football.  He likes to have a Kleenex on the top of his to keep his head warm.  Note written on white board to alert following staff members.  Wife is still at bedside but getting ready to leave facility.  Lavender essential oil applied and  [employer] is on.  Patient appears relaxed.  He remains confused to date (Sept 2007 was his first answer) and does not remember name of hospital.  He stated he did not know why he is here.  There is still potential for impulsive behavior.  He had few brief irritable/frustrated moments but was redirectable.  He still says he wants to go home.  When safety concerns are brought up, he stated \"I only fell that one time\".  He was a bit more understanding when it was mentioned that it would be too much of a drive to go to [facility].  "

## 2021-11-07 NOTE — ED NOTES
IV placed at MD discretion, advised admitting MD that pt has already pulled 3 IV's out, MD still wanted one placed before pt went upstairs.

## 2021-11-07 NOTE — PLAN OF CARE
Problem: Adult Inpatient Plan of Care  Goal: Optimal Comfort and Wellbeing  Outcome: Improving     Problem: Restraint/Seclusion for Violent Self-Destructive Behavior  Goal: Prevent future episodes of restraint or seclusion  Description: Identify nonphysical alternatives to restraint or seclusion.  Identify additional de-escalation supportive measures to use as alternatives to restraint or seclusion.  Outcome: Improving     Problem: Violence Risk or Actual  Goal: Anger and Impulse Control  Outcome: Improving  Intervention: Minimize Safety Risk  Recent Flowsheet Documentation  Taken 11/7/2021 1600 by Nasima Herring RN  Behavior Management:   boundaries reinforced   impulse control promoted  Sensory Stimulation Regulation: care clustered  Intervention: Promote Self-Control  Recent Flowsheet Documentation  Taken 11/7/2021 1600 by Nasima Herring RN  Supportive Measures:   active listening utilized   positive reinforcement provided   relaxation techniques promoted   verbalization of feelings encouraged  Environmental Support:   calm environment promoted   caregiver consistency promoted   comfort object encouraged   distractions minimized   environmental consistency promoted   rest periods encouraged     Problem: Risk for Delirium  Goal: Improved Behavioral Control  Outcome: Improving  Intervention: Prevent and Manage Agitation  Recent Flowsheet Documentation  Taken 11/7/2021 1600 by Nasima Herring RN  Environment Familiarity/Consistency: (Shoes from home are on) personal clothing/items utilized  Goal: Improved Attention and Thought Clarity  Outcome: Improving  Intervention: Maximize Cognitive Function  Recent Flowsheet Documentation  Taken 11/7/2021 1600 by Nasima Herring RN  Sensory Stimulation Regulation: care clustered  Reorientation Measures:   clock in view   familiar social contact encouraged   reorientation provided

## 2021-11-07 NOTE — PLAN OF CARE
Problem: Violence Risk or Actual  Goal: Anger and Impulse Control  Outcome: Improving  Patient became very agitated and combative, he was trying to pull out the IV and get out of bed, he was not redirectable and was swinging at staff, House officer updated, Zyprexa IM given and soft restraints applied to bilateral wrists.  Restraints were in use from 5912-8107.   Patient remained safe with no injuries, 1:1 sitter continued.

## 2021-11-07 NOTE — PROGRESS NOTES
Spouse arrived for visit. Lunch ordered.  Patient sitting in chair and watching [employer] game with spouse.

## 2021-11-07 NOTE — UTILIZATION REVIEW
Concurrent stay review; Secondary Review Determination     Under the authority of the Utilization Management Committee, the utilization review process indicated a secondary review on Cliff Clark.  The review outcome is based on review of the medical records, discussions with staff, and applying clinical experience noted on the date of the review.        (x) Observation Status Appropriate - Concurrent stay review ADDENDUM: Inpatient Status Appropriate    RATIONALE FOR DETERMINATION   76 yr old male with Alzheimer dementia presented 11/6/21 with syncope event and found to have RLL infiltrate.  Transient oxygen need.  Chest pain and r/o MI.  IV abx transitioned to PO.  Did need zyprexa IM last night due to agitation and 1:1 .  Discussed with Dr Buckner.  ADDENDUM:  Dr. Buckner contacted me later in day.  Ongoing encephalopathy beyond baseline and weak beyond normal secondary to pneumonia. Not discharging and crossing second night.      Patient is clinically improving and there is no clear indication to change patient's status to inpatient. The severity of illness, intensity of service provided, expected LOS and risk for adverse outcome make the care appropriate for observation.    The information on this document is developed by the utilization review team in order for the business office to ensure compliance.  This only denotes the appropriateness of proper admission status and does not reflect the quality of care rendered.         The definitions of Inpatient Status and Observation Status used in making the determination above are those provided in the CMS Coverage Manual, Chapter 1 and Chapter 6, section 70.4.      Sincerely,   Chelsie Elliott MD  Utilization Review  Physician Advisor  [facility]

## 2021-11-07 NOTE — PROGRESS NOTES
[facility]    Medicine Progress Note - Hospitalist Service       Date of Admission:  11/6/2021    Assessment & Plan              Cliff Clark is a 76 year old male admitted on 11/6/2021 with right lower lobe pneumonia and left-sided chest pain.    1. Right lower lobe pneumonia  --Community-acquired pneumonia, COVID-19 testing is negative  --Started Ceftriaxone and Azithromycin on diagnosis, changed to oral Azith/Omnicef to minimize IV lines due to dementia.  --Had mild hypoxia on presentation to the emergency department which has since resolved, continue to monitor and provide supplemental oxygen as needed  --Monitor for any symptoms of dysphagia given this is a right-sided pneumonia    2. Reports of left-sided chest pain, likely MSK  -- Cardiac seem less likely, he did fall on his left side 3 days ago so the pain could be related to that.  --Check serial troponin (normal), EKG shows right bundle branch block and short IA interval, no signs of ischemia  --PE run was negative for PE  --Echocardiogram results okay   -- discontinue tele    3. Alzheimer dementia, with acute encephalopathy  --Patient is an unreliable historian  -- Aricept started January 2021, Namenda started June 2021   --Delirium prevention protocols in place, he did have episode of acute encephalopathy and required sedative overnight 11/6-11/7, improved today but irritable this afternoon  -- Removing telemetry wires, IV site and using video monitor to minimize lines and minimize interruptions overnight tonight, trying to avoid need for any more sedating medication    4. Unwitnessed falls with increased weakness  --This could be mechanical falls, could be syncopal events related to the pneumonia and some mild hypoxia at home  -- No orthostasis   --Telemetry without events, discontinue tele  -- PT/OT  -- May need home care    5. Tobacco use  --Cessation is encouraged    6. Urinary incontinence:  -- Started low dose oxybutynin  just 2 weeks ago by urology -- will stop this in case is making encephalopathy worse  -- Continue flomax    Patient is crossing second midnight and is continuing to require active hospital cares with new encephalopathy requiring anti-psychotic medication, weakness worsened from baseline and still requiring active medication management with changes to his medications to attempt to improve agitation/irritability. Cannot use IV medication as he gets confused and removes IV site (demonstrated at least 3 times). Requiring 1:1 and video monitoring for safety.     Diet: Regular Diet Adult    DVT Prophylaxis: Ambulation/short stay  Ryan Catheter: Not present  Central Lines: None  Code Status: Full Code      Disposition Plan   Expected discharge:  11/7 vs 11/8 recommended to prior living arrangement once mental status stable, improvement in strength/PT eval.     The patient's care was discussed with the Patient and Patient's Family.    Ruth Buckner MD  Hospitalist Service  [facility]  Securely message with the Vocera Web Console (learn more here)  Text page via [employer] Paging/Directory        Clinically Significant Risk Factors Present on Admission              # Platelet Defect: home medication list includes an antiplatelet medication      ______________________________________________________________________    Interval History   He became agitated and was not redirectable overnight and required a dose of IM Zyprexa. This morning he is back to baselien mental status but his strength is worse and he is more unsteady.    Data reviewed today: I reviewed all medications, new labs and imaging results over the last 24 hours.     Physical Exam   Vital Signs: Temp: 97.5  F (36.4  C) Temp src: Oral BP: (!) 148/74 Pulse: 63   Resp: 16 SpO2: 93 % O2 Device: None (Room air) Oxygen Delivery: 2 LPM  Weight: 172 lbs 9.6 oz  General: Well developed elderly adult man, No apparent distress  Head: Normocephalic,  atraumatic  Cardiovascular: Regular rate and rhythm, Normal S1, S2  Lungs: Clear to auscultation bilaterally  Abdomen: Soft, Non-tender, not distended, Bowel sounds present  Extremities: No edema, no clubbing  Skin: Warm and well-perfused without lesions.  Neurologic: Alert and forgetful. Face is symmetric, Moves all extremities equally      Data   Recent Labs   Lab 11/07/21  0801 11/06/21  1650 11/06/21  0150   WBC 8.7  --  11.9*   HGB 13.5  --  14.0   MCV 95  --  95     --  237     --  140   POTASSIUM 4.3  --  4.8   CHLORIDE 104  --  103   CO2 30  --  29   BUN 12  --  16   CR 0.91  --  1.23   ANIONGAP 8  --  8   RYAN 10.0  --  9.7   GLC 85 101* 107     No results found for this or any previous visit (from the past 24 hour(s)).

## 2021-11-07 NOTE — PROGRESS NOTES
Pt up to bathroom.  He was incontinent small amount and urinated small amount. Unable to get accurate output due to no collection hat being in toilet.  Pt gait unsteady without walker and he was resistant to using at first.  Encouragement given and pt agreed to use.  Patient assisted back to chair.  He did begin to pull at protective coban wrapped around IV.  Reminded patient that IV needs to stay in and he did leave it alone then.  Patient continues to watch [employer] game with spouse.

## 2021-11-07 NOTE — PROGRESS NOTES
11/07/21 0910   Quick Adds   Type of Visit Initial PT Evaluation   Living Environment   People in home spouse   Current Living Arrangements house   Home Accessibility stairs to enter home;stairs within home   Number of Stairs, Main Entrance 2   Stair Railings, Main Entrance railings safe and in good condition   Number of Stairs, Within Home, Primary 10   Stair Railings, Within Home, Primary railings safe and in good condition   Transportation Anticipated family or friend will provide   Self-Care   Usual Activity Tolerance excellent   Current Activity Tolerance moderate   Equipment Currently Used at Home cane, straight   Disability/Function   Walking or Climbing Stairs Difficulty no   Dressing/Bathing Difficulty no   Toileting issues no   General Information   Onset of Illness/Injury or Date of Surgery 11/06/21   Referring Physician Ruth Buckner MD   Patient/Family Therapy Goals Statement (PT) return home    Pertinent History of Current Problem (include personal factors and/or comorbidities that impact the POC) Pneumonia, falls, hx of dementia    Existing Precautions/Restrictions fall  (1:1 RN )   Range of Motion (ROM)   ROM Quick Adds ROM WFL   Bed Mobility   Bed Mobility supine-sit   Supine-Sit [place] (Bed Mobility) supervision;verbal cues   Transfers   Transfers sit-stand transfer   Sit-Stand Transfer   Sit-Stand [place] (Transfers) contact guard   Assistive Device (Sit-Stand Transfers) walker, front-wheeled   Sit/Stand Transfer Comments Cues for safety.    Gait/Stairs (Locomotion)   [place] Level (Gait) contact guard   Assistive Device (Gait) walker, front-wheeled   Distance in Feet (Required for LE Total Joints) 165'   Pattern (Gait) step-through   Deviations/Abnormal Patterns (Gait) gait speed decreased   Clinical Impression   Criteria for Skilled Therapeutic Intervention yes, treatment indicated   PT Diagnosis (PT) Impaired functional mobility    Influenced by the following impairments  Impaired transfers, impaired gait, immpaired balance    Functional limitations due to impairments balance    Clinical Presentation Stable/Uncomplicated   Clinical Presentation Rationale Presents as diagnosed    Clinical Decision Making (Complexity) moderate complexity   Therapy Frequency (PT) Daily   Predicted Duration of Therapy Intervention (days/wks) 3 days    Planned Therapy Interventions (PT) balance training;bed mobility training;gait training;stair training;transfer training   Anticipated Equipment Needs at Discharge (PT) walker, rolling  (FWW)   Risk & Benefits of therapy have been explained patient   PT Discharge Planning    PT Discharge Recommendation (DC Rec) home with assist;home with home care physical therapy  (pending progress with stairs )   Total Evaluation Time   Total Evaluation Time (Minutes) 10     Alexia Lares, PT, DPT  11/7/2021

## 2021-11-07 NOTE — ED NOTES
Pt is on a one to one with tech in room.  Vitals being done Q4 hours.  Pt is resting in the recliner

## 2021-11-07 NOTE — PROGRESS NOTES
Patient tugging at coban dressing that is protecting IV.  Pt redirected and informed IV is needed (per protocol for tele).  IV flushed and rewrapped.  Pt states he wants to go home.  Reminded him that he will meet with therapy this afternoon where stair assessment will be done.  He did well walking with rolling walker and therapy in the hallway earlier today.  Pt sitting up in chair watching the [employer] game.  He declines lunch at this time stating that he is not hungry.

## 2021-11-08 NOTE — PROGRESS NOTES
Pt spontaneously transferred self back to bed from chair.  Wife put call light on but patient was in bed by time staff answered light.  Bed alarm turned on.  Alarm pad set in chair for when he spontaneouly transfers self again. Patient does not call for assist and does not use his walker.  Pt is unsteady without walker and requires standby assist.  Guidance given at times when balance is off.

## 2021-11-08 NOTE — PROGRESS NOTES
Care Management Follow Up    Length of Stay (days): 1    Expected Discharge Date: 11/10/2021       Concerns to be Addressed:   To be determined. Looking for a TCU bed.  Patient plan of care discussed at interdisciplinary rounds: Yes    Anticipated Discharge Disposition:  Transitional care (TCU) recommended.      Anticipated Discharge Services:  Daily therapy, skilled nursing.   Anticipated Discharge DME:  Per therapy (if indicated).    Patient/family educated on Medicare website which has current facility and service quality ratings:  Yes  Education Provided on the Discharge Plan:  Per team  Patient/Family in Agreement with the Plan:  Yes; spouse does not want to take patient directly home.     Referrals Placed by CM/SW:  See below  Private pay costs discussed: Not applicable at this time.    Additional Information:  Patient has a history of Alzheimer's dementia. He lives with his spouse Claritza ([phone])  in a private residence in [place]. Wife assists with dressing, bathing as needed, re-directing, meals, meds, house work, transportation. Patient ambulates on his own but is more unsteady latetly. At this time, he doesn't use a cane or walker but fell outside three before he admitted. Spouse notes that patient wanders at times. Wife would like PT; home care at discharge if possible. Family is interested in resources for home or long term memory care options. Writer placed referral to [facility] for assist post discharge. Therapy is recommending transitional care prior to returning home.  Referrals have been made (See below). Once he is ready to discharge to home, anticipate he will have skilled in-home care and possible additional care through assist from [facility] or memory care at some point. Family will transport upon DC.  10:40 AM:  Met with patient's wife Claritza at the bedside to provide an update on discharge planning. She states that her first choice for transitional care is actually [facility].  "Writer will make a referral.   10:55 AM:  Update provided to Claritza who is now asking for a referral to [facility] to see if their swing unit might have a bed. Called [facility] who stated that they do have a swing unit ([phone]). Left a message for admissions to discuss.   2:02 PM:  Spoke with admissions for [facility] who will review. Initial referral faxed to her at [phone].  2:44 PM:  Update provide to patient's wife Claritza at the bedside. Patient slightly agitated, stating, \"I just want to go home!\".  Per Claritza, if there is no transitonal care by tomorrow, she will consider taking patient home. She states there are 3 steps in to the home and then patient can live on one level. However, the family room is in the basement. Claritza will consider moving the TV up from the basement to the living room. If she does take him home, she would be interested in home care. She has a friend who is a  who she will ask for home care agency recommendations. Writer provided Claritza with a brochure for [facility] also. Claritza plans to be here at 0830 in the morning and CM will meet with her then to provide updates and discuss plan.  Of note, patient was able to ambulate down past ICU while writer was entering this note so appears to be moving well at this time.     Referrals made to:    [facility] (number for facility incorrect in Epic: call [phone] and ask for admissions) (left message for admissions)    [facility] at [phone], ask for admissions. Currently has a waiting list but will review. Fax number is [phone]) (DECLINED, cannot manage patients with Alzheimer's)    [facility] (left message for admissions) (DECLINED, not taking any patients who have a history of smoking)    [facility] (Reviewing)    [facility] (left message for admissions) (Initial referral faxed manually)    [facility] (Reviewing, no beds currently)    Tara Mcrae RN

## 2021-11-08 NOTE — PLAN OF CARE
Pt reports no pain through the shift. VSS on room air, vitals taken at 0200 and 0545 when patient got up for the bathroom. Pt slept undisturbed otherwise. Lung sounds diminished but clear. No behavioral issues notes, very pleasant and following requests. At start of shift, disoriented to time, place and situation but in the AM, only disoriented to situation. Calm and cooperative     Problem: Risk for Delirium  Goal: Improved Behavioral Control  Outcome: Improving  Goal: Improved Sleep  Outcome: Improving     Problem: Infection (Pneumonia)  Goal: Resolution of Infection Signs and Symptoms  Outcome: Improving     Problem: Respiratory Compromise (Pneumonia)  Goal: Effective Oxygenation and Ventilation  Outcome: Improving

## 2021-11-08 NOTE — PROGRESS NOTES
[facility]    Medicine Progress Note - Hospitalist Service    Date of Admission:  11/6/2021     Assessment & Plan   SUMMARY:  76 year old male with significant alzheimer dementia admitted on 11/6/2021 with right lower lobe pneumonia and left-sided chest pain.    Active problems:  1. Right lower lobe pneumonia  --Community-acquired pneumonia, COVID-19 testing is negative  --Started Ceftriaxone and Azithromycin on diagnosis, changed to oral Azith/Omnicef to minimize IV lines due to dementia.  --Had mild hypoxia on presentation to the emergency department which has since resolved, continue to monitor and provide supplemental oxygen as needed  --Monitor for any symptoms of dysphagia given this is a right-sided pneumonia    2. Reports of left-sided chest pain, likely MSK  -- Cardiac seem less likely, he did fall on his left side 3 days ago so the pain could be related to that.  --Check serial troponin (normal), EKG shows right bundle branch block and short DC interval, no signs of ischemia  --PE run was negative for PE  --Echocardiogram results okay   -- discontinue tele    3. Alzheimer dementia, with acute encephalopathy  --Patient is an unreliable historian  -- Aricept started January 2021, Namenda started June 2021   --Delirium prevention protocols in place, he did have episode of acute encephalopathy and required sedative overnight 11/6-11/7, improved today but irritable this afternoon  -- Removing telemetry wires, IV site and using video monitor to minimize lines and minimize interruptions overnight tonight, trying to avoid need for any more sedating medication    4. Unwitnessed falls with increased weakness  --This could be mechanical falls, could be syncopal events related to the pneumonia and some mild hypoxia at home  -- No orthostasis   --Telemetry without events, discontinue tele  -- PT/OT  -- May need home care    5. Tobacco use  --Cessation is encouraged    6. Urinary  incontinence:  -- Started low dose oxybutynin just 2 weeks ago by urology -- will stop this in case is making encephalopathy worse  -- Continue flomax    Patient is crossing second midnight and is continuing to require active hospital cares with new encephalopathy requiring anti-psychotic medication, weakness worsened from baseline and still requiring active medication management with changes to his medications to attempt to improve agitation/irritability. Cannot use IV medication as he gets confused and removes IV site (demonstrated at least 3 times). Requiring 1:1 and video monitoring for safety.         DVT prophylaxis:    Medical:  early ambulation    Mechanical:  PCD's    Disposition Plan: Expected discharge: 11/09/2021   recommended to TCU once safe disposition plan/ TCU bed available.    Diet: Orders Placed This Encounter      Regular Diet Adult    Ryan Catheter: Not present  Central Lines/Port-a-cath: Not present  Drains: Not present     --------------------------------------------    The patient's care was discussed with the Patient and Patient's Family.    Active Problems:    Community acquired bacterial pneumonia    Syncope, unspecified syncope type    Chest pain, unspecified type    Encephalopathy acute      Thomas Ray MD  Hospitalist Service  [facility]  Securely message with the Vocera Web Console (learn more here)  Text page via [employer] Paging/Directory    Clinically Significant Risk Factors Present on Admission            ______________________________________________________________________    Interval History   Patient irritable today, refusing any more blood draws. Blood last drawn yesterday am. Asking to leave soon.     ROS: Denies chest pain, denies shortness of breath, Moving bowels well, passing urine well and good appetite    Data personally reviewed from the last 24 hours:   Reviewed Laboratory results, Consultant recommendations and medications.    Physical Exam    /63 (BP Location: Left arm)   Pulse 61   Temp 98.2  F (36.8  C) (Oral)   Resp 18   Ht 1.829 m (6')   Wt 78.3 kg (172 lb 9.6 oz)   SpO2 90%   BMI 23.41 kg/m      Physical Exam    General Appearance:  HEENT:  Awake, Alert, Cooperative, in no distress and appears stated age   Normocephalic, atraumatic, conjunctiva clear without icterus and ears without discharge   Lungs:   Clear to auscultation bilaterally, no wheezing, good air exchange, normal work of breathing   Cardiovascular:  Regular Rate and Rythm, normal apical impulse, normal S1 and S2, no lower extremity edema bilaterally   Abdomen: Soft, non-tender and Non-distended, active bowel sounds   Skin:  Skin color, texture normal and bruising or bleeding. No rashes or lesions over face, neck, arms and legs, turgor normal.   Neurologic:    Neuropsychiatric: Alert but confused, Facial symmetry preserved and upper & lower extremities moving well with symmetry  irritable     Data   Recent Labs   Lab 11/07/21  0801 11/06/21  1650 11/06/21  0150   WBC 8.7  --  11.9*   HGB 13.5  --  14.0   MCV 95  --  95     --  237     --  140   POTASSIUM 4.3  --  4.8   CHLORIDE 104  --  103   CO2 30  --  29   BUN 12  --  16   CR 0.91  --  1.23   ANIONGAP 8  --  8   RYAN 10.0  --  9.7   GLC 85 101* 107

## 2021-11-08 NOTE — PLAN OF CARE
Problem: Adult Inpatient Plan of Care  Goal: Absence of Hospital-Acquired Illness or Injury  Outcome: No Change  Intervention: Identify and Manage Fall Risk  Recent Flowsheet Documentation  Taken 11/7/2021 1810 by Nasima Herring RN  Safety Promotion/Fall Prevention:    activity supervised    bed alarm on    clutter free environment maintained    fall prevention program maintained    lighting adjusted  Taken 11/7/2021 1621 by Nasima Herring RN  Safety Promotion/Fall Prevention:    activity supervised    bed alarm on    bedside attendant    clutter free environment maintained    fall prevention program maintained    increased rounding and observation    increase visualization of patient    lighting adjusted    nonskid shoes/slippers when out of bed    room organization consistent    sitter at bedside    supervised activity  Taken 11/7/2021 0730 by Nasima Herring RN  Safety Promotion/Fall Prevention:    activity supervised    bed alarm on    bedside attendant    clutter free environment maintained    fall prevention program maintained    increase visualization of patient    lighting adjusted    nonskid shoes/slippers when out of bed    sitter at bedside    supervised activity    toileting scheduled  Intervention: Prevent Skin Injury  Recent Flowsheet Documentation  Taken 11/7/2021 0730 by Nasima Herring RN  Body Position: supine     Problem: Fluid Imbalance (Pneumonia)  Goal: Fluid Balance  Outcome: No Change     Problem: Adult Inpatient Plan of Care  Goal: Optimal Comfort and Wellbeing  Outcome: Improving     Problem: Restraint/Seclusion for Violent Self-Destructive Behavior  Goal: Prevent future episodes of restraint or seclusion  Description: Identify nonphysical alternatives to restraint or seclusion.  Identify additional de-escalation supportive measures to use as alternatives to restraint or seclusion.  Outcome: Improving     Problem: Violence Risk or Actual  Goal: Anger and Impulse  Control  Outcome: Improving  Intervention: Minimize Safety Risk  Recent Flowsheet Documentation  Taken 11/7/2021 1600 by Nasima Herring RN  Behavior Management:    boundaries reinforced    impulse control promoted  Sensory Stimulation Regulation: care clustered  Intervention: Promote Self-Control  Recent Flowsheet Documentation  Taken 11/7/2021 1600 by Nasima Herring RN  Supportive Measures:    active listening utilized    positive reinforcement provided    relaxation techniques promoted    verbalization of feelings encouraged  Environmental Support:    calm environment promoted    caregiver consistency promoted    comfort object encouraged    distractions minimized    environmental consistency promoted    rest periods encouraged     Problem: Risk for Delirium  Goal: Improved Behavioral Control  Outcome: Improving  Intervention: Prevent and Manage Agitation  Recent Flowsheet Documentation  Taken 11/7/2021 1600 by Nasima Herring RN  Environment Familiarity/Consistency: (Shoes from home are on) personal clothing/items utilized  Goal: Improved Attention and Thought Clarity  Outcome: Improving  Intervention: Maximize Cognitive Function  Recent Flowsheet Documentation  Taken 11/7/2021 1600 by Nasima Herring RN  Sensory Stimulation Regulation: care clustered  Reorientation Measures:    clock in view    familiar social contact encouraged    reorientation provided  Goal: Improved Sleep  Outcome: Improving     Problem: Infection (Pneumonia)  Goal: Resolution of Infection Signs and Symptoms  Outcome: Improving     Problem: Respiratory Compromise (Pneumonia)  Goal: Effective Oxygenation and Ventilation  Outcome: Improving  Intervention: Optimize Oxygenation and Ventilation  Recent Flowsheet Documentation  Taken 11/7/2021 1810 by Nasima Herring RN  Head of Bed (HOB) Positioning: HOB at 20-30 degrees  Pt taking PO antibiotics for pneumonia.  Sp02 low to mid 90s on RA.  Lungs diminished in bases.  There is  order for sputum culture but no cough or sputum production to collect.  PT denies having difficulty breathing. Hx of refusing pills, agitation, hx of dementia.  PRN zyprexa was given this evening for increasing behaviors.  He is currently sleeping.  Allowing patient to rest.  He was taken off tele this afternoon and IV orders were discontinued (per attending).  Pt snoring. Resp 16-18.  Sp02 88%-89% on RA while laying at about 10 degrees.  Increased HOB to about 20-30 degrees and SpO2 90%-92% on RA

## 2021-11-08 NOTE — PLAN OF CARE
Problem: Adult Inpatient Plan of Care  Goal: Absence of Hospital-Acquired Illness or Injury  Outcome: No Change     Problem: Fluid Imbalance (Pneumonia)  Goal: Fluid Balance  Outcome: No Change     Problem: Adult Inpatient Plan of Care  Goal: Optimal Comfort and Wellbeing  Outcome: Improving     Problem: Risk for Delirium  Goal: Improved Behavioral Control  Outcome: Improving  Goal: Improved Sleep  Outcome: Improving     Problem: Infection (Pneumonia)  Goal: Resolution of Infection Signs and Symptoms  11/8/2021 1002 by Nasima Herring RN  Outcome: Improving  11/7/2021 2046 by Nasima Herring RN  Outcome: Improving     Problem: Respiratory Compromise (Pneumonia)  Goal: Effective Oxygenation and Ventilation  11/8/2021 1002 by Nasima Herring RN  Outcome: Improving  11/7/2021 2046 by Nasima Herring RN  Outcome: Improving   Pt set off bed alarm this AM and was assisted with bathroom needs.  Therapy saw him shortly after that and arrived for visit around the same time.  Wife stated therapy seemed to go better this AM.  Pt using transfer belt and walker.  He sat up in chair for breakfast. Pt took medications this AM without issue.  He used wrist watch to state correct day and month.  He stated year was 2008 (It is 11/08 today).  He is not clear on location or reason why he is here. Pt stating again that he wants to go back [place] today.  He is calm and pleasant with wife sitting next to him watching the morning news.

## 2021-11-09 NOTE — PLAN OF CARE
Patient only oriented to self. He is easily agitated and angry when assisting patient. He yelled multiple times at nursing assistants. Around 0500 patient set off bed alarm and almost fell to the restroom but the NA caught him. Patient got very angry and ripped his arm away from the NA. After he was done using the bathroom he went back to bed. Pt slept for most of the night.     Attempted to stand near patient for safety but he gets extremely agitated.

## 2021-11-09 NOTE — DISCHARGE SUMMARY
[facility]  Hospitalist Discharge Summary      Date of Admission:  11/6/2021  Date of Discharge:  11/9/2021  Discharging Provider: Thomas Ray MD    SUMMARY:  76 year old male with significant alzheimer dementia admitted on 11/6/2021 with right lower lobe pneumonia and left-sided chest pain.    Discharge Diagnoses   1. Right lower lobe pneumonia  --Community-acquired pneumonia, COVID-19 testing is negative  --Started Ceftriaxone and Azithromycin on diagnosis, changed to oral Azith/Omnicef  --Had mild hypoxia on presentation to the emergency department which has since resolved, continue to monitor and provide supplemental oxygen as needed  --Monitor for any symptoms of dysphagia given this is a right-sided pneumonia     2. Reports of left-sided chest pain, likely MSK  -- Cardiac seem less likely, he did fall on his left side 3 days ago so the pain could be related to that.  --Check serial troponin (normal), EKG shows right bundle branch block and short CO interval, no signs of ischemia  --PE run was negative for PE  --Echocardiogram results okay      3. Alzheimer dementia, with acute encephalopathy  --Patient is an unreliable historian  -- Aricept started January 2021, Namenda started June 2021   --Delirium prevention protocols in place, he did have episode of acute encephalopathy and required sedative overnight 11/6-11/7, improved today but irritable this afternoon  -- assessed by psychiatry, added low dose seroquel    4. Unwitnessed falls with increased weakness  --This could be mechanical falls, could be syncopal events related to the pneumonia and some mild hypoxia at home  -- No orthostasis   --Telemetry without events, discontinue tele  -- PT/OT  -- home care ordered     5. Tobacco use  --Cessation is encouraged     6. Urinary incontinence:  -- Started low dose oxybutynin just 2 weeks ago by urology  -- Continue flomax     Follow-ups Needed After Discharge   Follow-up Appointments      Follow-up and recommended labs and tests       Follow up with primary care provider, THOMAS PEREZ, within 7 days to   evaluate medication change and for hospital follow- up. No further testing   indicated for now.             Discharge Disposition   Discharged to home with home care  Condition at discharge: Stable    Consultations This Hospital Stay   PHYSICAL THERAPY ADULT IP CONSULT  OCCUPATIONAL THERAPY ADULT IP CONSULT  PHARMACY IP CONSULT  SOCIAL WORK IP CONSULT  PSYCHIATRY IP CONSULT    Code Status   Full Code    Time Spent on this Encounter   I, Thomas Ray MD, personally saw the patient today and spent greater than 30 minutes discharging this patient.       Thomas Ray MD  [facility]  [address] [place] [id number]  Phone: [phone]  Fax: [phone]  ______________________________________________________________________    Physical Exam   Vital Signs: Temp: 97.6  F (36.4  C) Temp src: Oral BP: 119/63 Pulse: 62   Resp: 16 SpO2: 92 % O2 Device: None (Room air)    Weight: 172 lbs 9.6 oz  Constitutional: awake, alert, cooperative, no apparent distress, and appears stated age  ENT: normocepalic, without obvious abnormality, atramatic  Respiratory: No increased work of breathing, good air exchange, clear to auscultation bilaterally, no crackles or wheezing  Cardiovascular: normal apical pulses  and normal S1 and S2  GI: normal bowel sounds, soft and non-distended  Neurologic: Mental Status Exam:  Level of Alertness:   awake  Orientation:   person  Memory:   abnormal - very poor short term, reduced long term memory  Motor Exam:  moves all extremities well and symmetrically  Sensory:  Sensory intact       Primary Care Physician   THOMAS PEREZ    Discharge Orders      Home care nursing referral      Home Care PT Referral for Hospital Discharge      Home Care OT Referral for Hospital Discharge      Home Care Social Service Referral for Hospital  Discharge      Reason for your hospital stay    Pneumonia     Follow-up and recommended labs and tests     Follow up with primary care provider, VITALIY PEREZ, within 7 days to evaluate medication change and for hospital follow- up. No further testing indicated for now.     Activity    Your activity upon discharge: activity as tolerated     MD face to face encounter    Documentation of Face to Face and Certification for Home Health Services    I certify that patient: Cliff Clark is under my care and that I, or a nurse practitioner or physician's assistant working with me, had a face-to-face encounter that meets the physician face-to-face encounter requirements with this patient on: 11/9/2021.    This encounter with the patient was in whole, or in part, for the following medical condition, which is the primary reason for home health care: Pneumonia.    I certify that, based on my findings, the following services are medically necessary home health services: Nursing, Occupational Therapy, Physical Therapy, Social Work, and Home Health Aide.    My clinical findings support the need for the above services because: Nurse is needed: To assess breathing, mental status after changes in medications or other medical regimen.., Occupational Therapy Services are needed to assess and treat cognitive ability and address ADL safety due to impairment in memory., and Physical Therapy Services are needed to assess and treat the following functional impairments: balance, strength, stamina.    Further, I certify that my clinical findings support that this patient is homebound (i.e. absences from home require considerable and taxing effort and are for medical reasons or Confucianism services or infrequently or of short duration when for other reasons) because: Mental health symptoms including: Patient gets lost or wanders to due to cognitive impairments...    Based on the above findings. I certify that this patient is confined to the  home and needs intermittent skilled nursing care, physical therapy and/or speech therapy.  The patient is under my care, and I have initiated the establishment of the plan of care.  This patient will be followed by a physician who will periodically review the plan of care.  Physician/Provider to provide follow up care: Thomas Maldonado    Attending hospital physician (the Medicare certified [facility] provider): Thomas Ray MD  Physician Signature: See electronic signature associated with these discharge orders.  Date: 11/9/2021     Diet    Follow this diet upon discharge: Orders Placed This Encounter      Regular Diet Adult       Significant Results and Procedures   Most Recent 3 CBC's:Recent Labs   Lab Test 11/07/21  0801 11/06/21  0150   WBC 8.7 11.9*   HGB 13.5 14.0   MCV 95 95    237     Most Recent 3 BMP's:Recent Labs   Lab Test 11/07/21  0801 11/06/21  1650 11/06/21  0150     --  140   POTASSIUM 4.3  --  4.8   CHLORIDE 104  --  103   CO2 30  --  29   BUN 12  --  16   CR 0.91  --  1.23   ANIONGAP 8  --  8   RYAN 10.0  --  9.7   GLC 85 101* 107     Most Recent 2 LFT's:No lab results found.,   Results for orders placed or performed during the hospital encounter of 11/06/21   CT Chest Pulmonary Embolism w Contrast    Narrative    EXAM: CT CHEST PULMONARY EMBOLISM W CONTRAST  LOCATION: [facility]  DATE/TIME: 11/6/2021 2:39 AM    INDICATION: PE suspected, low/intermediate probability, positive D-dimer. Chest pain.  COMPARISON: None.  TECHNIQUE: CT chest pulmonary angiogram during arterial phase injection of IV contrast. Multiplanar reformats and MIP reconstructions were performed. Dose reduction techniques were used.   CONTRAST: Isovue 370 75mL    FINDINGS:  ANGIOGRAM CHEST: Pulmonary arteries are normal caliber and negative for pulmonary emboli. Thoracic aorta is negative for dissection. No CT evidence of right heart strain.    LUNGS AND PLEURA: Minimal centrilobular and  paraseptal emphysema. Hazy ill-defined interstitial and alveolar infiltrate medial superior segment right lower lobe. Minimal linear atelectasis.    MEDIASTINUM/AXILLAE: No lymphadenopathy. Normal esophagus. No significant pericardial effusion.    CORONARY ARTERY CALCIFICATION: Severe.    UPPER ABDOMEN: Exophytic well-circumscribed homogeneously low-attenuation lesion left lower renal pole demonstrating Hounsfield units most compatible with a cyst for which no further follow-up is necessary (less than 20).    MUSCULOSKELETAL: Minimal degenerative changes in the spine.      Impression    IMPRESSION:  1.  No evidence for pulmonary embolism.    2.  Normal caliber aorta without dissection.    3.  Hazy interstitial and alveolar infiltrate medial aspect superior segment right lower lobe compatible with pneumonia.    4.  Minimal centrilobular and paraseptal emphysema.   Echocardiogram Complete     Value    LVEF  65-70%    [facility]    [id number]  [id number]  [id number]^PAVEL^FABIANA^[place] [address]     Name: NGUYỄN MELISSA  MRN: [id number]  : 1945  Study Date: 2021 08:48 AM  Age: 76 yrs  Gender: Male  Patient Location: [facility]  Reason For Study: Syncope and Collapse  Ordering Physician: FABIANA ZHAO  Performed By: MILY     BSA: 1.9 m2  Height: 72 in  Weight: 155 lb  HR: 93  ______________________________________________________________________________  ______________________________________________________________________________  Interpretation Summary     There is mild eccentric left ventricular hypertrophy.  The visual ejection fraction is 65-70%.  No significant valve diesease.  ______________________________________________________________________________  Left Ventricle  The left ventricle is normal in size. There is mild eccentric left ventricular  hypertrophy. The visual ejection fraction is 65-70%. No regional wall motion  abnormalities  noted.     Right Ventricle  The right ventricle is borderline dilated. TAPSE is normal, which is  consistent with normal right ventricular systolic function.     Atria  Normal left atrial size. Right atrial size is normal. Intact atrial septum.     Mitral Valve  Mitral valve leaflets appear normal. There is no evidence of mitral stenosis  or clinically significant mitral regurgitation.     Tricuspid Valve  Tricuspid valve leaflets appear normal. There is no evidence of tricuspid  stenosis or clinically significant tricuspid regurgitation.     Aortic Valve  Aortic valve leaflets appear normal. There is no evidence of aortic stenosis  or clinically significant aortic regurgitation.     Pulmonic Valve  The pulmonic valve is not well visualized.     Vessels  The aorta root is normal. IVC diameter <2.1 cm collapsing >50% with sniff  suggests a normal RA pressure of 3 mmHg.     Pericardium  There is no pericardial effusion.     ______________________________________________________________________________  MMode/2D Measurements & Calculations  IVSd: 1.2 cm  LVIDd: 4.3 cm  LVIDs: 2.6 cm  LVPWd: 0.92 cm     FS: 40.7 %  LV mass(C)d: 158.3 grams  LV mass(C)dI: 82.8 grams/m2  Ao root diam: 3.3 cm  LA dimension: 2.8 cm  LA/Ao: 0.85  LVOT diam: 1.9 cm  LVOT area: 3.0 cm2  LA Volume Indexed (AL/bp): 21.4 ml/m2  RWT: 0.42     Doppler Measurements & Calculations  MV E max matty: 71.6 cm/sec  MV A max matty: 92.4 cm/sec  MV E/A: 0.77  MV dec slope: 333.4 cm/sec2  MV dec time: 0.21 sec     Ao V2 max: 127.6 cm/sec  Ao max P.0 mmHg  Ao V2 mean: 88.9 cm/sec  Ao mean PG: 3.7 mmHg  Ao V2 VTI: 29.1 cm  MICH(I,D): 2.7 cm2  MICH(V,D): 2.8 cm2  LV V1 max P.7 mmHg  LV V1 max: 119.4 cm/sec  LV V1 VTI: 26.6 cm  SV(LVOT): 79.1 ml  SI(LVOT): 41.4 ml/m2  PA acc time: 0.11 sec  AV Matty Ratio (DI): 0.94  MICH Index (cm2/m2): 1.4  E/E' av.8  Lateral E/e': 6.6  Medial E/e': 9.0      ______________________________________________________________________________  Report approved by: Americo Hubbard 11/06/2021 01:41 PM               Discharge Medications   Current Discharge Medication List      START taking these medications    Details   azithromycin (ZITHROMAX) 250 MG tablet Take 1 tablet (250 mg) by mouth daily for 1 day  Qty: 1 tablet, Refills: 0    Associated Diagnoses: Community acquired bacterial pneumonia      cefdinir (OMNICEF) 300 MG capsule Take 1 capsule (300 mg) by mouth every 12 hours  Qty: 6 capsule, Refills: 0    Associated Diagnoses: Community acquired bacterial pneumonia      !! QUEtiapine (SEROQUEL) 25 MG tablet Take 0.25 tablets (6.25 mg) by mouth 3 times daily  Qty: 23 tablet, Refills: 0    Associated Diagnoses: Encephalopathy acute      !! QUEtiapine (SEROQUEL) 25 MG tablet Take 1 tablet (25 mg) by mouth At Bedtime  Qty: 30 tablet, Refills: 0    Associated Diagnoses: Encephalopathy acute       !! - Potential duplicate medications found. Please discuss with provider.      CONTINUE these medications which have NOT CHANGED    Details   aspirin-acetaminophen-caffeine (EXCEDRIN MIGRAINE) 250-250-65 MG tablet Take 2 tablets by mouth daily as needed      donepezil (ARICEPT) 10 MG tablet Take 10 mg by mouth daily      ibuprofen (ADVIL/MOTRIN) 200 MG capsule Take 200 mg by mouth every 4 hours as needed      memantine (NAMENDA) 10 MG tablet Take 20 mg by mouth daily       oxybutynin ER (DITROPAN-XL) 5 MG 24 hr tablet Take 5 mg by mouth daily      sertraline (ZOLOFT) 100 MG tablet Take 100 mg by mouth daily      tamsulosin (FLOMAX) 0.4 MG capsule Take 0.4 mg by mouth daily      Vitamin D3 (CHOLECALCIFEROL) 25 mcg (1000 units) tablet Take 1 tablet by mouth daily           Allergies   No Known Allergies

## 2021-11-09 NOTE — PLAN OF CARE
Problem: Adult Inpatient Plan of Care  Goal: Absence of Hospital-Acquired Illness or Injury  Outcome: No Change  Intervention: Identify and Manage Fall Risk  Recent Flowsheet Documentation  Taken 11/8/2021 1600 by Nasima Herring RN  Safety Promotion/Fall Prevention: (wife sitting at bedside)   activity supervised   clutter free environment maintained   fall prevention program maintained   increased rounding and observation   increase visualization of patient   lighting adjusted   nonskid shoes/slippers when out of bed   patient and family education   patient video monitoring   safety round/check completed   supervised activity   toileting scheduled  Taken 11/8/2021 1200 by Nasima Herring RN  Safety Promotion/Fall Prevention: (wife sitting at bedside)   activity supervised   clutter free environment maintained   fall prevention program maintained   increased rounding and observation   increase visualization of patient   lighting adjusted   nonskid shoes/slippers when out of bed   patient and family education   patient video monitoring   safety round/check completed   supervised activity   toileting scheduled  Taken 11/8/2021 0800 by Nasima Herring RN  Safety Promotion/Fall Prevention: (wife sitting at bedside)   activity supervised   clutter free environment maintained   fall prevention program maintained   increased rounding and observation   increase visualization of patient   lighting adjusted   nonskid shoes/slippers when out of bed   patient and family education   patient video monitoring   safety round/check completed   supervised activity   toileting scheduled     Problem: Violence Risk or Actual  Goal: Anger and Impulse Control  Outcome: No Change  Intervention: Minimize Safety Risk  Recent Flowsheet Documentation  Taken 11/8/2021 1600 by Nasima Herring RN  Behavior Management: impulse control promoted  Taken 11/8/2021 1200 by Nasima Herring RN  Behavior Management: impulse control  promoted  Taken 11/8/2021 0800 by Nasima Herring, RN  Behavior Management: impulse control promoted     Problem: Risk for Delirium  Goal: Improved Behavioral Control  11/8/2021 1948 by Nasima Herring, RN  Outcome: No Change  11/8/2021 1002 by Nasima eHrring, RN  Outcome: Improving   Wife sat with patient at bedside from morning till about 1600 this afternoon.  1:1 has been discontinued.   Pt is calm this evening but remains uncooperative/forgetful with calling for assistance.  He has had agitated moments but returned to calm state when given time and space and reminded that the goal is for him to go home tomorrow.  PRN zyprexa was not given today.  He will be moving closer to the nurses desk for safety reasons once room is ready.  Drea with Psych will see him tomorrow.  Seroquel to start this evening.

## 2021-11-09 NOTE — PLAN OF CARE
Problem: Infection (Pneumonia)  Goal: Resolution of Infection Signs and Symptoms  Outcome: Adequate for Discharge     Problem: Respiratory Compromise (Pneumonia)  Goal: Effective Oxygenation and Ventilation  Outcome: Adequate for Discharge   VS stable.he denies SOB and chest pain.  Pt discharge to home and  discharge instruction paper done with his spouse.  His spouse denies any concerns and  question about discharge instruction or medication.  All belongs and medication  send with them. Pt transported by W/c .

## 2021-11-09 NOTE — PROGRESS NOTES
Care Management Follow Up    Length of Stay (days): 2    Expected Discharge Date: 11/09/2021       Concerns to be Addressed:   Anticipate discharge to home today.   Patient plan of care discussed at interdisciplinary rounds: Yes    Anticipated Discharge Disposition:  Transitional care (TCU) vs Home with Home Care recommended.      Anticipated Discharge Services: Continued therapy, skilled nursing, medical social work and a home health aide.   Anticipated Discharge DME:  Per therapy (if indicated).    Patient/family educated on Medicare website which has current facility and service quality ratings:  Yes  Education Provided on the Discharge Plan:  Per team  Patient/Family in Agreement with the Plan:  Yes    Referrals Placed by CM/SW:  See below  Private pay costs discussed: Not applicable at this time.    Additional Information:  Met with patient's wife, Claritza, to discuss discharge planning. Claritza understands that the barriers to TCU today are: moving too well to qualify at this time and behaviors that staff at TCU are unable to manage.  Discussed continuing to work with [facility] to find memory care sooner than later. Also discussed adding private pay in the home to provide assist (as soon as possible so patient can develop a relationship with new caregivers). Plan is for patient to return home at discharge and Claritza would like home care (home PT, OT, Rn, SW and home health aide). She did not call her friend (who is a ) for home care agency preferences but states that she really has no preference except if they could [facility] ([facility] a [facility]  to the [facility]).  She needs to call her daughter Jenny to come help her get the patient home and was 'uncertain if she is ready today'. Encouraged her to call Jenny and speak with the MD (patient has not had these behaviors at home, per Claritza, and explained that being in a strange environment is likely exacerbating them.)   Care management will work on finding a home care agency in [place].   9:15 AM:  Called [facility] to make a referral for home PT, OT, SW, RN and a HHA. Awaiting a call back from intake ([phone]).  10:15 AM:  No call back yet from [facility]. Claritza is asking for a referral to be made to [facility] ([phone]).  Spoke with [facility] who can open patient for skilled home care this week. However, they have no SW on staff. Will update Claritza. They request that the initial referral and orders be faxed to them at [phone].  11:12 AM:  An update was provided to patient's wife who agreed with the plan.   3:47 PM:  Note patient has discharged. Orders were printed and faxed to [phone]. Called [facility] to follow up on the referral ([phone]). Writer has left a message for intake with a confidential call back number.    History:  Patient has a history of Alzheimer's dementia. He lives with his spouse Claritza ([phone])  in a private residence in [place]. Wife assists with dressing, bathing as needed, re-directing, meals, meds, house work, transportation. Patient ambulates on his own but is more unsteady latetly. At this time, he doesn't use a cane or walker but fell outside three before he admitted. Spouse notes that patient wanders at times. Wife would like PT; home care at discharge if possible. Family is interested in resources for home or long term memory care options. Writer placed referral to [facility] for assist post discharge. Therapy is recommending transitional care prior to returning home.  Referrals have been made (See below). Once he is ready to discharge to home, anticipate he will have skilled in-home care and possible additional care through assist from [facility] or memory care at some point. Family will transport upon DC.     Referrals made to:    [facility] (number for facility incorrect in Epic: call [phone] and ask  for admissions) (left message for admissions) (no response)    [facility] ([facility] at [phone], ask for admissions. Currently has a waiting list but will review. Fax number is [phone]) (DECLINED, cannot manage patients with Alzheimer's)    [facility] (left message for admissions) (DECLINED, not taking any patients who have a history of smoking)    [facility] (Reviewing) (No response)    [facility] (left message for admissions) (Initial referral faxed manually) (No response    [facility] (Reviewing, no beds currently)    Tara Mcrae RN

## 2021-11-09 NOTE — PLAN OF CARE
Occupational Therapy Discharge Summary    Reason for therapy discharge:    Discharged to home with home therapy.    Progress towards therapy goal(s). See goals on Care Plan in [facility] electronic health record for goal details.  Goals partially met.  Barriers to achieving goals:   discharge from facility.    Therapy recommendation(s):    Continued therapy is recommended.  Rationale/Recommendations:  ADLs.        Karin Rios, OTS

## 2021-11-09 NOTE — DISCHARGE INSTRUCTIONS
A referral has been made for you to [facility] for home physical and occupational therapy, skilled nursing visits and a home health aide. They do not have a  on staff.  They should call you to arrange the first visit but, if you have questions or concerns, you can call them at [phone]. Thank you.

## 2021-11-09 NOTE — PLAN OF CARE
Physical Therapy Discharge Summary    Reason for therapy discharge:    Discharged to home with home therapy.    Progress towards therapy goal(s). See goals on Care Plan in [facility] electronic health record for goal details.  Goals partially met.  Barriers to achieving goals:   Pt needs SBA due to cognition/safety cues.  .    Therapy recommendation(s):    Continued therapy is recommended.  Rationale/Recommendations:  To improve mobility and strength. .

## 2021-11-10 NOTE — PROGRESS NOTES
"Clinic Care Coordination Contact  [facility]: Post-Discharge Note  SITUATION                                                      Admission:    Admission Date: 11/06/21   Reason for Admission: Right lower lobe pneumonia  Discharge:   Discharge Date: 11/09/21  Discharge Diagnosis: Right lower lobe pneumonia    BACKGROUND                                                      Cliff Clark is a 76 year old male who has history of Alzheimer's dementia and urinary incontinence who presented to the emergency department with chest pain and unwitnessed falls.  On presentation he was accompanied by his wife who is since gone home.  The patient is an unreliable historian because of his dementia.     Most of the history is taken from the chart.   The patient reported sudden onset of sharp stabbing left-sided chest pain that began after dinner yesterday.  He reports the pain is been constant in nature since it came on its located at the bottom of the left rib cage anteriorly.  He endorses worsening with deep breath and exertion.  He says it is potentially somewhat worse as well with palpation to that area.  Upon questioning, he reports the pain is still present.     The patient and his wife are visiting from [place] where they reside. His wife noted that about a week ago the patient had a sudden onset of diaphoresis, chest pain, lightheadedness and dizziness when vacuuming.  He rested after having the symptoms but then had episode of diarrhea and vomiting.  After vomiting his symptoms improved.   His wife reports his gait has been more unstable recently. He fell about 3 days ago while out walking. A bystander saw the fall and called EMS. He declined transport to the hospital and was instead driven home. He sustained a left sided facial abrasion at that time.     ASSESSMENT      Enrollment  Primary Care Care Coordination Status: Not a Candidate    Discharge Assessment  How are you doing now that you are home?: \"Doing " "good\"  How are your symptoms? (Red Flag symptoms escalate to triage hotline per guidelines): Improved  Do you feel your condition is stable enough to be safe at home until your provider visit?: Yes  Does the patient have their discharge instructions? : Yes  Does the patient have questions regarding their discharge instructions? : No  Were you started on any new medications or were there changes to any of your previous medications? : Yes  Does the patient have all of their medications?: Yes  Do you have questions regarding any of your medications? : No  Do you have all of your needed medical supplies or equipment (DME)?  (i.e. oxygen tank, CPAP, cane, etc.): Yes  Discharge follow-up appointment scheduled within 14 calendar days? : No  Is patient agreeable to assistance with scheduling? : No    Post-op ([facility] Only)  If the patient had a surgery or procedure, do they have any questions for a nurse?: No    PLAN                                                      Outpatient Plan:    Follow-up and recommended labs and tests      Follow up with primary care provider, VITALIY PEREZ, within 7 days to   evaluate medication change and for hospital follow- up. No further testing   indicated for now.       No future appointments.      For any urgent concerns, please contact our 24 hour nurse triage line: [phone])         LU[facility] Lea  [phone]  [facility]    "

## 2023-08-17 NOTE — SIGNIFICANT EVENT
"Patient requested to call spouse.  He started their conversation with \"get me the hell out of here\".  He feels like he is being held here and asking if he is a convict. Wife aware of increasing anxiety/agitation and mentioned \"I think you'll have to give him something\".  Patient sat at edge of bed after phone conversation and wanted to get his shoes on.  Offered to assist to him to the chair but he refused to have transfer belt on.  Informed him he would need to stay in bed then so unsafe transfer could be avoided.  He asked for poison and said he didn't want to live like this.  Patient getting agitated and refusing medications.  Informed him medication was antibiotic to help him get better from pneumonia.  Pt stated \"I don't have pneumonia\".  He then asked for something to help him sleep.  Staffed asked him if he would take the antibiotic first.  He did.  Pt was then given 2.5 of zyprexa PRN.  PRN nicotine gum was offered earlier.  Patient declined stating he didn't want anything nicotine except for a pack of Marlboros and a lighter.  Patient sleeping approx 30-40 minutes after zyprexa given.  Will allow patient to rest uninterrupted.  "
"Wife was sitting next to patient when patient spontaneously got up to use bathroom on his own.  Wife encouraged him to wait for staff to help and he lightly pushed her. She pressed call light and Writer answered it.  Writer found patient sitting safely on toilet.  Pt allowed staff to walk him back to chair in arms reach.  His gait is unsteady.  Pateint states he does not want to be in this \"asylum\" anymore.  He is irritable/agitated/frustrated.  Asked him what we could do to make things better and he stated \"get me out of here\".  Txt page sent to Hospitalist to update.  Nicotine gum requested from pharmacy.  He is sitting up in chair at the moment and not making attempt to get up on own at this time.  He is not actively aggressive at this time but is mildly agitated.  PRN zyprexa is ready to be given should he act out.   "
Significant Event Note    Time of event: 1:56 AM November 7, 2021    Description of event:  Paged by RN as patient is agitated, history of alzheimer's dementia. Acute encephalopathy order set already used with delirium precautions but no PRNs for agitation. Patient has 1:1 sitter but RN requesting soft restraints for wrists to protect staff.    Plan:  Went to see the patient, order for soft restraints placed. Dose of 2.5 mg IM Zyprexa ordered as patient not agreeable to taking pills, too agitated.    Within an hour after restraint an in person face to face assessment was completed at 1:59 AM, including an evaluation of the patient's immediate reaction to the intervention, behavioral assessment and review/assessment of history, drugs and medications, recent labs, etc., and behavioral condition.  The patient experienced: No adverse physical outcome from seclusion/restraint initiation.  The intervention of restraint or seclusion needs to continue.    Discussed with: bedside nurse    Heidy Martinez MD    
Abdominal pain

## 2023-12-06 NOTE — CONSULTS
INITIAL PSYCHIATRIC CONSULTATION  This note was created with the help of Dragon dictation system. Grammatical and typing errors are not intentional.        REASON FOR REQUEST: pt w alzheimer's, agitated, irritable      ASSESSMENT/RECOMMENDATIONS/PLAN :   Metabolic encephalopathy exacerbated in the context of hospital environment, advanced age, medical condition underlying history of Alzheimer's disease.  Agitation, restlessness, impulsivity likely due to acute care environment, medical issues.  Cognitive and behavioral changes exacerbated in acute care environment.  Sleep disturbance.  Alzheimer's disease by history now with agitation in acute care environment    Recommendations:  Efforts at sleep regulation.  Patient remains at high risk for sundowning syndrome, continue to monitor.  Donepezil 10 mg at suppertime.  Memantine 20 mg daily.  Sertraline 100 mg daily  Seroquel 6.25 mg 3 times a day and 25 mg at bedtime.  Continue to reassure, redirect and anticipate needs.  Minimize delirium genic medications.        MENTAL STATUS EXAMINATION:   Appearance: Stated age, fluctuating levels of consciousness, resting comfortably this morning.  Speech: Confused, devoid of content  Thought Process: Disorganized, disoriented and confused..  Thought content: Does not appear to respond to internally generated stimuli, no acute visual or auditory hallucination; .  No manic symptoms, no paranoia no delusional thoughts.  Thought Formation: No loosening of association or flight of ideas.  Judgment: Impaired  Insight : Impaired  Attention : Distracted, fluctuating levels of consciousness  Memory: Depressed  Fund Of Knowledge: Depressed  Affect: Flat  Mood: Congruent  Alert and Oriented: Arousable, orientation x1  Comprehension: Depressed at present  Generative thought content: Reduced  Language: Intact  Gait and Ambulation: With assist of one.  Problem solving: Impaired.        Vital Signs: BP (!) 155/76 (BP Location: Right arm)   Report and care to Shay Jacobsen    Pulse 65   Temp 97.6  F (36.4  C) (Oral)   Resp 18   Ht 1.829 m (6')   Wt 78.3 kg (172 lb 9.6 oz)   SpO2 91%   BMI 23.41 kg/m        HISTORY OF PRESENT ILLNESS:   Presenting history to include: Per [facility]:   Cliff Clark is a 76 year old male who has history of Alzheimer's dementia and urinary incontinence who presented to the emergency department with chest pain and unwitnessed falls.  On presentation he was accompanied by his wife who is since gone home.  The patient is an unreliable historian because of his dementia.Most of the history is taken from the chart.   The patient reported sudden onset of sharp stabbing left-sided chest pain that began after dinner yesterday.  He reports the pain is been constant in nature since it came on its located at the bottom of the left rib cage anteriorly.  He endorses worsening with deep breath and exertion.  He says it is potentially somewhat worse as well with palpation to that area.  Upon questioning, he reports the pain is still present.The patient and his wife are visiting from [place] where they reside. His wife noted that about a week ago the patient had a sudden onset of diaphoresis, chest pain, lightheadedness and dizziness when vacuuming.  He rested after having the symptoms but then had episode of diarrhea and vomiting.  After vomiting his symptoms improved.   His wife reports his gait has been more unstable recently. He fell about 3 days ago while out walking. A bystander saw the fall and called EMS. He declined transport to the hospital and was instead driven home. He sustained a left sided facial abrasion at that time.     Upon assessment, patient was noted to be resting comfortably in bed.  He is arousable, however not engaging in any meaningful conversation.  He is unaware of hospital environment as well as his current condition.  Collateral information from chart review.  Patient is easily agitated, angry, yelling at staff when assisted with  "cares.  He slept all night.  Patient had been calm and early evening, remaining uncooperative and forgetful, not following commands at times such as using colloids for assistance.  Reviewed home medications to include but not limited to donepezil 10 mg daily, memantine 20 mg daily, Zoloft 200 mg daily and given quetiapine 12.5 mg 3 times a day.  Patient does not take quetiapine at home.    See below some of the nursing documentation:  11/8  Wife was sitting next to patient when patient spontaneously got up to use bathroom on his own.  Wife encouraged him to wait for staff to help and he lightly pushed her. She pressed call light and Writer answered it.  Writer found patient sitting safely on toilet.  Pt allowed staff to walk him back to chair in arms reach.  His gait is unsteady.  Pateint states he does not want to be in this \"asylum\" anymore.  He is irritable/agitated/frustrated.  Asked him what we could do to make things better and he stated \"get me out of here\".  Txt page sent to Hospitalist to update.  Nicotine gum requested from pharmacy.  He is sitting up in chair at the moment and not making attempt to get up on own at this time.  He is not actively aggressive at this time but is mildly agitated.  PRN zyprexa is ready to be given should he act out.     Pt set off bed alarm this AM and was assisted with bathroom needs.  Therapy saw him shortly after that and arrived for visit around the same time.  Wife stated therapy seemed to go better this AM.  Pt using transfer belt and walker.  He sat up in chair for breakfast. Pt took medications this AM without issue.  He used wrist watch to state correct day and month.  He stated year was 2008 (It is 11/08 today).  He is not clear on location or reason why he is here. Pt stating again that he wants to go back [place] today.  He is calm and pleasant with wife sitting next to him watching the morning news.     Review of Systems:As per HPI. Remainders of 12 point review of " systems negative.  Psychiatric ROS:  Patient is unable to provide any meaningful information.        PFSH reviewed  and not pertinent to chief complaint/reason for visit  BP (!) 155/76 (BP Location: Right arm)   Pulse 65   Temp 97.6  F (36.4  C) (Oral)   Resp 18   Ht 1.829 m (6')   Wt 78.3 kg (172 lb 9.6 oz)   SpO2 91%   BMI 23.41 kg/m    No results found for: AMPHET, PCP, BARBIT, OXYCODONE, THC, ETOH  @24HOURRESULTS@  Recent Results (from the past 72 hour(s))   Echocardiogram Complete    Collection Time: 11/06/21 10:00 AM   Result Value Ref Range    LVEF  65-70%    Troponin I    Collection Time: 11/06/21 10:51 AM   Result Value Ref Range    Troponin I 0.13 0.00 - 0.29 ng/mL   Procalcitonin    Collection Time: 11/06/21 10:51 AM   Result Value Ref Range    Procalcitonin 0.04 0.00 - 0.49 ng/mL   Troponin I    Collection Time: 11/06/21  1:26 PM   Result Value Ref Range    Troponin I 0.12 0.00 - 0.29 ng/mL   Glucose by meter    Collection Time: 11/06/21  4:50 PM   Result Value Ref Range    GLUCOSE BY METER POCT 101 (H) 70 - 99 mg/dL   Basic metabolic panel    Collection Time: 11/07/21  8:01 AM   Result Value Ref Range    Sodium 142 136 - 145 mmol/L    Potassium 4.3 3.5 - 5.0 mmol/L    Chloride 104 98 - 107 mmol/L    Carbon Dioxide (CO2) 30 22 - 31 mmol/L    Anion Gap 8 5 - 18 mmol/L    Urea Nitrogen 12 8 - 28 mg/dL    Creatinine 0.91 0.70 - 1.30 mg/dL    Calcium 10.0 8.5 - 10.5 mg/dL    Glucose 85 70 - 125 mg/dL    GFR Estimate 82 >60 mL/min/1.73m2   CBC with platelets    Collection Time: 11/07/21  8:01 AM   Result Value Ref Range    WBC Count 8.7 4.0 - 11.0 10e3/uL    RBC Count 4.37 (L) 4.40 - 5.90 10e6/uL    Hemoglobin 13.5 13.3 - 17.7 g/dL    Hematocrit 41.6 40.0 - 53.0 %    MCV 95 78 - 100 fL    MCH 30.9 26.5 - 33.0 pg    MCHC 32.5 31.5 - 36.5 g/dL    RDW 13.8 10.0 - 15.0 %    Platelet Count 234 150 - 450 10e3/uL       PMH:   Past Medical History:   Diagnosis Date     Late onset Alzheimer's dementia without  behavioral disturbance (H)      Mixed hyperlipidemia      Urinary incontinence            Current Medications:Scheduled Meds:    azithromycin  250 mg Oral Daily     cefdinir  300 mg Oral Q12H FATIMAH     donepezil  10 mg Oral Daily     memantine  20 mg Oral Daily     QUEtiapine  12.5 mg Oral TID     sertraline  100 mg Oral Daily     tamsulosin  0.4 mg Oral Daily     Vitamin D3  25 mcg Oral Daily     Continuous Infusions:  PRN Meds:.acetaminophen **OR** acetaminophen, acetaminophen, aspirin-acetaminophen-caffeine, ibuprofen, melatonin, nicotine, OLANZapine, ondansetron **OR** ondansetron                Family History: PERSONALLY REVIEWED.  Family History   Problem Relation Age of Onset     Alzheimer Disease Father      Pertinent Family hx not pertinent to Chief Complaint or reason for visit.     Social History:  PERSONALLY REVIEWED.  Social History     Socioeconomic History     Marital status:      Spouse name: Not on file     Number of children: Not on file     Years of education: Not on file     Highest education level: Not on file   Occupational History     Not on file   Tobacco Use     Smoking status: Current Every Day Smoker     Packs/day: 0.25     Types: Cigarettes     Smokeless tobacco: Not on file   Substance and Sexual Activity     Alcohol use: Not on file     Drug use: Not on file     Sexual activity: Not on file   Other Topics Concern     Not on file   Social History Narrative     Not on file     Social Determinants of Health     Financial Resource Strain: Not on file   Food Insecurity: Not on file   Transportation Needs: Not on file   Physical Activity: Not on file   Stress: Not on file   Social Connections: Not on file   Intimate Partner Violence: Not on file   Housing Stability: Not on file    not pertinent to Chief Complaint or reason for visit.             Allergies as of 06/01/2014 Reviewed     Review of Systems:As per HPI. Remainders of 12 point review of systems negative.    Review of Pertinent  Laboratory:      PERSONALLY REVIEWED.    Physical Exam: Temp:  [97.6  F (36.4  C)-98.4  F (36.9  C)] 97.6  F (36.4  C)  Pulse:  [61-65] 65  Resp:  [16-18] 18  BP: (128-177)/(63-94) 155/76  SpO2:  [90 %-92 %] 91 %   Vitals: reviewed in chart     Physical exam as per medical team: reviewed in chart      diagnoses, risk and benefits of medications discussed with staff. Care coordination with care management team.   Thank you for this consultation.       Drea Hobbs; NP  [facility]        This note was created with the help of Dragon dictation system. Grammatical and typing errors are not intentional.